# Patient Record
Sex: FEMALE | Race: BLACK OR AFRICAN AMERICAN | NOT HISPANIC OR LATINO | Employment: FULL TIME | URBAN - METROPOLITAN AREA
[De-identification: names, ages, dates, MRNs, and addresses within clinical notes are randomized per-mention and may not be internally consistent; named-entity substitution may affect disease eponyms.]

---

## 2017-08-04 ENCOUNTER — APPOINTMENT (OUTPATIENT)
Dept: LAB | Facility: HOSPITAL | Age: 52
End: 2017-08-04

## 2017-08-04 ENCOUNTER — APPOINTMENT (OUTPATIENT)
Dept: ONCOLOGY | Facility: CLINIC | Age: 52
End: 2017-08-04

## 2017-08-14 ENCOUNTER — LAB (OUTPATIENT)
Dept: OTHER | Facility: HOSPITAL | Age: 52
End: 2017-08-14

## 2017-08-14 ENCOUNTER — OFFICE VISIT (OUTPATIENT)
Dept: ONCOLOGY | Facility: CLINIC | Age: 52
End: 2017-08-14

## 2017-08-14 VITALS
HEART RATE: 86 BPM | BODY MASS INDEX: 36.37 KG/M2 | TEMPERATURE: 98.4 F | RESPIRATION RATE: 16 BRPM | SYSTOLIC BLOOD PRESSURE: 124 MMHG | HEIGHT: 67 IN | WEIGHT: 231.7 LBS | OXYGEN SATURATION: 99 % | DIASTOLIC BLOOD PRESSURE: 78 MMHG

## 2017-08-14 DIAGNOSIS — D50.0 IRON DEFICIENCY ANEMIA DUE TO CHRONIC BLOOD LOSS: ICD-10-CM

## 2017-08-14 DIAGNOSIS — D68.51 FACTOR V LEIDEN MUTATION (HCC): Primary | ICD-10-CM

## 2017-08-14 DIAGNOSIS — D68.51 FACTOR V LEIDEN MUTATION (HCC): ICD-10-CM

## 2017-08-14 DIAGNOSIS — Z86.718 HISTORY OF DVT OF LOWER EXTREMITY: ICD-10-CM

## 2017-08-14 DIAGNOSIS — D56.3 BETA THALASSEMIA TRAIT: Primary | ICD-10-CM

## 2017-08-14 DIAGNOSIS — D89.2 HYPERGAMMAGLOBULINEMIA: ICD-10-CM

## 2017-08-14 LAB
BASOPHILS # BLD AUTO: 0.05 10*3/MM3 (ref 0–0.2)
BASOPHILS NFR BLD AUTO: 0.8 % (ref 0–1.5)
DEPRECATED RDW RBC AUTO: 42.5 FL (ref 37–54)
EOSINOPHIL # BLD AUTO: 0.06 10*3/MM3 (ref 0–0.7)
EOSINOPHIL NFR BLD AUTO: 0.9 % (ref 0.3–6.2)
ERYTHROCYTE [DISTWIDTH] IN BLOOD BY AUTOMATED COUNT: 13.7 % (ref 11.7–13)
FERRITIN SERPL-MCNC: 38.7 NG/ML (ref 13–150)
HCT VFR BLD AUTO: 34 % (ref 35.6–45.5)
HGB BLD-MCNC: 11.5 G/DL (ref 11.9–15.5)
IMM GRANULOCYTES # BLD: 0.02 10*3/MM3 (ref 0–0.03)
IMM GRANULOCYTES NFR BLD: 0.3 % (ref 0–0.5)
IRON 24H UR-MRATE: 57 MCG/DL (ref 37–145)
IRON SATN MFR SERPL: 13 % (ref 20–50)
LYMPHOCYTES # BLD AUTO: 2.14 10*3/MM3 (ref 0.9–4.8)
LYMPHOCYTES NFR BLD AUTO: 32.3 % (ref 19.6–45.3)
MCH RBC QN AUTO: 28.5 PG (ref 26.9–32)
MCHC RBC AUTO-ENTMCNC: 33.8 G/DL (ref 32.4–36.3)
MCV RBC AUTO: 84.4 FL (ref 80.5–98.2)
MONOCYTES # BLD AUTO: 0.48 10*3/MM3 (ref 0.2–1.2)
MONOCYTES NFR BLD AUTO: 7.2 % (ref 5–12)
NEUTROPHILS # BLD AUTO: 3.88 10*3/MM3 (ref 1.9–8.1)
NEUTROPHILS NFR BLD AUTO: 58.5 % (ref 42.7–76)
NRBC BLD MANUAL-RTO: 0 /100 WBC (ref 0–0)
PLATELET # BLD AUTO: 241 10*3/MM3 (ref 140–500)
PMV BLD AUTO: 10.4 FL (ref 6–12)
RBC # BLD AUTO: 4.03 10*6/MM3 (ref 3.9–5.2)
TIBC SERPL-MCNC: 437 MCG/DL (ref 298–536)
TRANSFERRIN SERPL-MCNC: 293 MG/DL (ref 200–360)
WBC NRBC COR # BLD: 6.63 10*3/MM3 (ref 4.5–10.7)

## 2017-08-14 PROCEDURE — 85025 COMPLETE CBC W/AUTO DIFF WBC: CPT | Performed by: INTERNAL MEDICINE

## 2017-08-14 PROCEDURE — 83540 ASSAY OF IRON: CPT | Performed by: INTERNAL MEDICINE

## 2017-08-14 PROCEDURE — 82728 ASSAY OF FERRITIN: CPT | Performed by: INTERNAL MEDICINE

## 2017-08-14 PROCEDURE — 99214 OFFICE O/P EST MOD 30 MIN: CPT | Performed by: INTERNAL MEDICINE

## 2017-08-14 PROCEDURE — 80053 COMPREHEN METABOLIC PANEL: CPT | Performed by: INTERNAL MEDICINE

## 2017-08-14 PROCEDURE — 36415 COLL VENOUS BLD VENIPUNCTURE: CPT

## 2017-08-14 PROCEDURE — 84466 ASSAY OF TRANSFERRIN: CPT | Performed by: INTERNAL MEDICINE

## 2017-08-14 RX ORDER — ASPIRIN 81 MG/1
81 TABLET ORAL DAILY
Qty: 30 TABLET | Refills: 11 | Status: SHIPPED | OUTPATIENT
Start: 2017-08-14 | End: 2019-04-25 | Stop reason: SDUPTHER

## 2017-08-14 RX ORDER — MELOXICAM 15 MG/1
15 TABLET ORAL
COMMUNITY
End: 2020-09-22 | Stop reason: SDDI

## 2017-08-14 RX ORDER — FERROUS SULFATE 325(65) MG
325 TABLET ORAL
Qty: 30 TABLET | Refills: 11 | Status: SHIPPED | OUTPATIENT
Start: 2017-08-14 | End: 2019-04-25 | Stop reason: SDUPTHER

## 2017-08-14 NOTE — PROGRESS NOTES
Subjective     CHIEF COMPLAINT:    1. Factor V Leiden heterozygous mutation with history of lower extremity DVT following hysterectomy in .   2. Beta thalassemia trait.     HISTORY OF PRESENT ILLNESS:     Kylie Bowie is a 52 y.o.  patient with the above medical history. She returns today for follow-up and she reports continued problems with arthritis in both shoulders.  She is not having pain in the left knee but reports occasional pain in the left leg distally.  She is no longer taking the aspirin.  We discussed at the last visit taking oral iron once a day but she states that she did not  the medicine and did not take any iron.  She remembers that she had problem with diarrhea in the past when she took oral iron.      Past Medical History:   Diagnosis Date   • Arthritis    • Asthma    • Beta thalassemia trait    • Factor V Leiden mutation     HETEROZYGOUS   • History of DVT of lower extremity    • Hypertension    • Leiomyoma    • Thrombophlebitis     Septic       Past Surgical History:   Procedure Laterality Date   •  SECTION  2003   • DIAGNOSTIC LAPAROSCOPY EXPLORATORY LAPAROTOMY  2000   • FOOT SURGERY     • HYSTERECTOMY     • HYSTEROTOMY  1999   • MYOMECTOMY         Cancer-related family history includes Cancer in her cousin, cousin, maternal aunt, and maternal grandmother.    SCHEDULED MEDS:       Current Outpatient Prescriptions:   •  albuterol (PROVENTIL HFA;VENTOLIN HFA) 108 (90 BASE) MCG/ACT inhaler, Inhale 2 puffs every 4 (four) hours as needed for wheezing., Disp: , Rfl:   •  amLODIPine (NORVASC) 10 MG tablet, Take 10 mg by mouth daily., Disp: , Rfl:   •  amLODIPine (NORVASC) 5 MG tablet, Take 5 mg by mouth daily., Disp: , Rfl:   •  aspirin 81 MG EC tablet, Take 81 mg by mouth daily., Disp: , Rfl:   •  atorvastatin (LIPITOR) 10 MG tablet, Take 10 mg by mouth Daily., Disp: , Rfl:   •  cetirizine (ZyrTEC) 10 MG tablet, Take 10 mg by mouth daily., Disp: , Rfl:  "  •  cyclobenzaprine (FLEXERIL) 10 MG tablet, Take 10 mg by mouth 3 (Three) Times a Day As Needed for muscle spasms., Disp: , Rfl:   •  Diclofenac Sodium 1.5 % solution, Place  on the skin., Disp: , Rfl:   •  ferrous sulfate 325 (65 FE) MG tablet, Take 1 tablet by mouth daily with breakfast., Disp: , Rfl:   •  fluticasone (FLONASE) 50 MCG/ACT nasal spray, 2 sprays into each nostril daily. Administer 2 sprays in each nostril for each dose., Disp: , Rfl:   •  lisinopril (PRINIVIL,ZESTRIL) 20 MG tablet, Take 20 mg by mouth daily., Disp: , Rfl:   •  meloxicam (MOBIC) 15 MG tablet, Take 15 mg by mouth., Disp: , Rfl:   •  montelukast (SINGULAIR) 10 MG tablet, Take 10 mg by mouth every night., Disp: , Rfl:   •  naproxen (NAPROSYN) 500 MG tablet, , Disp: , Rfl:   •  traMADol (ULTRAM) 50 MG tablet, , Disp: , Rfl:     REVIEW OF SYSTEMS:  GENERAL:  No fever or chills. No weight change.    SKIN:  No rashes or lesions.  HEME/LYMPH: Anemia.   RESPIRATORY:  No cough, shortness of breath, hemoptysis or wheezing.  CVS:  No chest pain.  GI:  No blood in the stool.  She will have evaluation by GI next month  MUSCULOSKELETAL:  See HPI.      Objective   VITAL SIGNS:     Vitals:    08/14/17 1619   BP: 124/78   Pulse: 86   Resp: 16   Temp: 98.4 °F (36.9 °C)   TempSrc: Oral   SpO2: 99%   Weight: 231 lb 11.2 oz (105 kg)   Height: 66.93\" (170 cm)  Comment: new pt       Wt Readings from Last 3 Encounters:   08/14/17 231 lb 11.2 oz (105 kg)   06/21/16 230 lb (104 kg)       PHYSICAL EXAMINATION:  GENERAL:  The patient appears in good general condition, not in acute distress.  SKIN:  No skin rashes or lesions. No ecchymosis or petechiae.  EXTREMITIES:  No cyanosis or edema. No calf tenderness. Tender in the left Leg above the ankle without redness or warmth.   NEUROLOGICAL:  No focal neurological deficits.    RESULT REVIEW:       Results from last 7 days  Lab Units 08/14/17  1612   WBC 10*3/mm3 6.63   NEUTROS ABS 10*3/mm3 3.88   HEMOGLOBIN g/dL " 11.5*   HEMATOCRIT % 34.0*   PLATELETS 10*3/mm3 241       Results from last 7 days  Lab Units 08/14/17  1612   FERRITIN ng/mL 38.70   IRON mcg/dL 57   TIBC mcg/dL 437       Assessment/Plan    1.  Factor V Leiden heterozygous mutation with history of left lower extremity DVT that developed after a myomectomy in 1999. She was placed on Xarelto 10 mg a day postoperatively after she had a foot surgery.  She was placed in the past on Lovenox prophylaxis during pregnancy.      She does not have symptoms to suggest deep vein thrombosis at present.  Her left leg pain is arthritic.     Patient has hypercholesterolemia and hypertension and has family history of coronary disease and CABG at the age of 62.  I asked her to take enteric coated ASA 81 mg a day.      2.  Anemia secondary to iron deficiency in a patient with Beta thalassemia trait.  She did not take the oral iron and that we discussed last year.  I will send the prescription to her pharmacy with oral iron to take once daily.    3.  Elevated total globulin.  This measured 4.3 g in May 2017 on labs done at her PCPs office.  I will repeat a CMP today.  If it remains elevated, we will need to obtain protein electrophoresis and immunofixation.    We will repeat a CBC with iron studies in 6 months.  We'll see in follow-up in one year with CBC and iron studies.        Merly Giang MD  08/14/17

## 2017-08-15 LAB
ALBUMIN SERPL-MCNC: 3.9 G/DL (ref 3.5–5.2)
ALBUMIN/GLOB SERPL: 1.1 G/DL
ALP SERPL-CCNC: 65 U/L (ref 39–117)
ALT SERPL W P-5'-P-CCNC: 13 U/L (ref 1–33)
ANION GAP SERPL CALCULATED.3IONS-SCNC: 12.6 MMOL/L
AST SERPL-CCNC: 21 U/L (ref 1–32)
BILIRUB SERPL-MCNC: 0.3 MG/DL (ref 0.1–1.2)
BUN BLD-MCNC: 18 MG/DL (ref 6–20)
BUN/CREAT SERPL: 17.1 (ref 7–25)
CALCIUM SPEC-SCNC: 8.6 MG/DL (ref 8.6–10.5)
CHLORIDE SERPL-SCNC: 102 MMOL/L (ref 98–107)
CO2 SERPL-SCNC: 25.4 MMOL/L (ref 22–29)
CREAT BLD-MCNC: 1.05 MG/DL (ref 0.57–1)
GFR SERPL CREATININE-BSD FRML MDRD: 67 ML/MIN/1.73
GLOBULIN UR ELPH-MCNC: 3.6 GM/DL
GLUCOSE BLD-MCNC: 93 MG/DL (ref 65–99)
POTASSIUM BLD-SCNC: 4.4 MMOL/L (ref 3.5–5.2)
PROT SERPL-MCNC: 7.5 G/DL (ref 6–8.5)
SODIUM BLD-SCNC: 140 MMOL/L (ref 136–145)

## 2017-09-26 ENCOUNTER — OFFICE VISIT (OUTPATIENT)
Dept: GASTROENTEROLOGY | Facility: CLINIC | Age: 52
End: 2017-09-26

## 2017-09-26 VITALS
SYSTOLIC BLOOD PRESSURE: 134 MMHG | BODY MASS INDEX: 35.12 KG/M2 | WEIGHT: 223.8 LBS | TEMPERATURE: 99 F | DIASTOLIC BLOOD PRESSURE: 84 MMHG | HEIGHT: 67 IN

## 2017-09-26 DIAGNOSIS — R19.7 DIARRHEA, UNSPECIFIED TYPE: Primary | ICD-10-CM

## 2017-09-26 DIAGNOSIS — R13.10 DYSPHAGIA, UNSPECIFIED TYPE: ICD-10-CM

## 2017-09-26 DIAGNOSIS — R12 HEARTBURN: ICD-10-CM

## 2017-09-26 PROCEDURE — 99203 OFFICE O/P NEW LOW 30 MIN: CPT | Performed by: INTERNAL MEDICINE

## 2017-09-26 NOTE — PROGRESS NOTES
Chief Complaint   Patient presents with   • Diarrhea     off & on   • Heartburn     Kylie Bowie is a 52 y.o. female who presents with Heartburn and intermittent difficulty swallowing solids.  She does note that occasionally she feels like the food won't go down.  She has trouble if she eats late at night.  She sometimes will wake up and the moment night and throughout.  This is worse with spicy or fatty foods.  She does not take medication for this regularly.  She's tried to modify her diet.  She is a long history of loose stools.  She has postprandial urgency.  She has 2-3 loose bowel movements daily.  She's not been any blood in her stool.  She's had no prior endoscopic evaluation.  She's no family history of colon rectal cancer or polyps.  HPI  Past Medical History:   Diagnosis Date   • Arthritis    • Asthma    • Beta thalassemia trait    • Factor V Leiden mutation     HETEROZYGOUS   • History of DVT of lower extremity    • Hypertension    • Leiomyoma    • Thrombophlebitis     Septic     Past Surgical History:   Procedure Laterality Date   •  SECTION  2003   • DIAGNOSTIC LAPAROSCOPY EXPLORATORY LAPAROTOMY  2000   • FOOT SURGERY     • HYSTERECTOMY     • HYSTEROTOMY  1999   • MYOMECTOMY         Current Outpatient Prescriptions:   •  albuterol (PROVENTIL HFA;VENTOLIN HFA) 108 (90 BASE) MCG/ACT inhaler, Inhale 2 puffs every 4 (four) hours as needed for wheezing., Disp: , Rfl:   •  amLODIPine (NORVASC) 10 MG tablet, Take 10 mg by mouth daily., Disp: , Rfl:   •  aspirin 81 MG EC tablet, Take 1 tablet by mouth Daily., Disp: 30 tablet, Rfl: 11  •  atorvastatin (LIPITOR) 10 MG tablet, Take 10 mg by mouth Daily., Disp: , Rfl:   •  lisinopril (PRINIVIL,ZESTRIL) 20 MG tablet, Take 20 mg by mouth daily., Disp: , Rfl:   •  amLODIPine (NORVASC) 5 MG tablet, Take 5 mg by mouth daily., Disp: , Rfl:   •  Diclofenac Sodium 1.5 % solution, Place  on the skin., Disp: , Rfl:   •  ferrous sulfate  325 (65 FE) MG tablet, Take 1 tablet by mouth Daily With Breakfast., Disp: 30 tablet, Rfl: 11  •  fluticasone (FLONASE) 50 MCG/ACT nasal spray, 2 sprays into each nostril daily. Administer 2 sprays in each nostril for each dose., Disp: , Rfl:   •  meloxicam (MOBIC) 15 MG tablet, Take 15 mg by mouth., Disp: , Rfl:   •  montelukast (SINGULAIR) 10 MG tablet, Take 10 mg by mouth every night., Disp: , Rfl:   •  naproxen (NAPROSYN) 500 MG tablet, , Disp: , Rfl:   •  traMADol (ULTRAM) 50 MG tablet, , Disp: , Rfl:   Allergies   Allergen Reactions   • Keflex [Cephalexin]    • Latex    • Amoxicillin-Pot Clavulanate Diarrhea     Social History     Social History   • Marital status: Single     Spouse name: N/A   • Number of children: N/A   • Years of education: N/A     Occupational History   • Not on file.     Social History Main Topics   • Smoking status: Never Smoker   • Smokeless tobacco: Never Used   • Alcohol use Yes      Comment: social   • Drug use: No   • Sexual activity: Not on file     Other Topics Concern   • Not on file     Social History Narrative     Family History   Problem Relation Age of Onset   • Heart disease Other    • Hypertension Mother    • Heart disease Father    • Diabetes Father    • Cancer Maternal Aunt      Breast   • Diabetes Maternal Aunt    • Lupus Paternal Aunt    • Cancer Maternal Grandmother    • Anemia Cousin    • Cancer Cousin    • Cancer Cousin      leukemia     Review of Systems   Constitutional: Negative for appetite change and unexpected weight change.   HENT: Positive for sinus pressure.    Respiratory: Positive for chest tightness.    Gastrointestinal: Positive for abdominal pain and diarrhea. Negative for blood in stool, nausea and vomiting.   All other systems reviewed and are negative.    Vitals:    09/26/17 1513   BP: 134/84   Temp: 99 °F (37.2 °C)     Last Weight    09/26/17  1513   Weight: 223 lb 12.8 oz (102 kg)     Physical Exam   Constitutional: She is oriented to person,  place, and time. She appears well-developed and well-nourished.   HENT:   Head: Normocephalic and atraumatic.   Eyes: Conjunctivae are normal. No scleral icterus.   Neck: Normal range of motion. Neck supple.   Cardiovascular: Normal rate and regular rhythm.    Pulmonary/Chest: Effort normal and breath sounds normal.   Abdominal: Soft. Bowel sounds are normal. She exhibits no distension. There is tenderness.   rlq pain   Musculoskeletal: She exhibits no edema.   Neurological: She is alert and oriented to person, place, and time.   Skin: Skin is warm and dry.   Psychiatric: She has a normal mood and affect.   Nursing note and vitals reviewed.    No images are attached to the encounter.  No notes on file  Kylie was seen today for diarrhea and heartburn.    Diagnoses and all orders for this visit:    Diarrhea, unspecified type  -     Case Request; Standing  -     Case Request    Dysphagia, unspecified type  -     Case Request; Standing  -     Case Request    Heartburn    Other orders  -     Implement Anesthesia orders day of procedure.; Standing  -     Obtain informed consent; Standing  -     Verify bowel prep was successful; Standing  -     Give tap water enema if bowel prep was insufficient; Standing  -     Give Fleet's enema for intolerance of bowel prep; Standing     plan-  Given her symptoms will plan for an EGD and colonoscopy for further evaluation.  She is not interested in adding additional medications at this time.  We'll hold off on acid suppression until we perform an EGD.  Further recommendations based upon the results of her scopes.  Consider trial of an antispasmodic for when necessary use if no inflammation is found.

## 2017-11-17 ENCOUNTER — ANESTHESIA (OUTPATIENT)
Dept: GASTROENTEROLOGY | Facility: HOSPITAL | Age: 52
End: 2017-11-17

## 2017-11-17 ENCOUNTER — HOSPITAL ENCOUNTER (OUTPATIENT)
Facility: HOSPITAL | Age: 52
Setting detail: HOSPITAL OUTPATIENT SURGERY
Discharge: HOME | End: 2017-11-17
Attending: INTERNAL MEDICINE | Admitting: INTERNAL MEDICINE

## 2017-11-17 ENCOUNTER — ANESTHESIA EVENT (OUTPATIENT)
Dept: GASTROENTEROLOGY | Facility: HOSPITAL | Age: 52
End: 2017-11-17

## 2017-11-17 VITALS
SYSTOLIC BLOOD PRESSURE: 126 MMHG | BODY MASS INDEX: 35.39 KG/M2 | HEART RATE: 82 BPM | TEMPERATURE: 97.7 F | DIASTOLIC BLOOD PRESSURE: 93 MMHG | WEIGHT: 225.5 LBS | RESPIRATION RATE: 16 BRPM | HEIGHT: 67 IN | OXYGEN SATURATION: 100 %

## 2017-11-17 DIAGNOSIS — R19.7 DIARRHEA, UNSPECIFIED TYPE: ICD-10-CM

## 2017-11-17 DIAGNOSIS — R13.10 DYSPHAGIA, UNSPECIFIED TYPE: ICD-10-CM

## 2017-11-17 LAB
B-HCG UR QL: NEGATIVE
INTERNAL NEGATIVE CONTROL: NEGATIVE
INTERNAL POSITIVE CONTROL: POSITIVE
Lab: NORMAL

## 2017-11-17 PROCEDURE — 88312 SPECIAL STAINS GROUP 1: CPT | Performed by: INTERNAL MEDICINE

## 2017-11-17 PROCEDURE — 25010000002 PROPOFOL 10 MG/ML EMULSION: Performed by: ANESTHESIOLOGY

## 2017-11-17 PROCEDURE — 43239 EGD BIOPSY SINGLE/MULTIPLE: CPT | Performed by: INTERNAL MEDICINE

## 2017-11-17 PROCEDURE — 88305 TISSUE EXAM BY PATHOLOGIST: CPT | Performed by: INTERNAL MEDICINE

## 2017-11-17 PROCEDURE — 45380 COLONOSCOPY AND BIOPSY: CPT | Performed by: INTERNAL MEDICINE

## 2017-11-17 PROCEDURE — S0260 H&P FOR SURGERY: HCPCS | Performed by: INTERNAL MEDICINE

## 2017-11-17 RX ORDER — PROPOFOL 10 MG/ML
VIAL (ML) INTRAVENOUS AS NEEDED
Status: DISCONTINUED | OUTPATIENT
Start: 2017-11-17 | End: 2017-11-17 | Stop reason: SURG

## 2017-11-17 RX ORDER — SODIUM CHLORIDE, SODIUM LACTATE, POTASSIUM CHLORIDE, CALCIUM CHLORIDE 600; 310; 30; 20 MG/100ML; MG/100ML; MG/100ML; MG/100ML
1000 INJECTION, SOLUTION INTRAVENOUS CONTINUOUS PRN
Status: DISCONTINUED | OUTPATIENT
Start: 2017-11-17 | End: 2017-11-17 | Stop reason: HOSPADM

## 2017-11-17 RX ORDER — SODIUM CHLORIDE 0.9 % (FLUSH) 0.9 %
3 SYRINGE (ML) INJECTION AS NEEDED
Status: DISCONTINUED | OUTPATIENT
Start: 2017-11-17 | End: 2017-11-17 | Stop reason: HOSPADM

## 2017-11-17 RX ORDER — LIDOCAINE HYDROCHLORIDE 20 MG/ML
INJECTION, SOLUTION INFILTRATION; PERINEURAL AS NEEDED
Status: DISCONTINUED | OUTPATIENT
Start: 2017-11-17 | End: 2017-11-17 | Stop reason: SURG

## 2017-11-17 RX ORDER — PROPOFOL 10 MG/ML
VIAL (ML) INTRAVENOUS CONTINUOUS PRN
Status: DISCONTINUED | OUTPATIENT
Start: 2017-11-17 | End: 2017-11-17 | Stop reason: SURG

## 2017-11-17 RX ORDER — OMEPRAZOLE 20 MG/1
20 CAPSULE, DELAYED RELEASE ORAL 2 TIMES DAILY
Qty: 180 CAPSULE | Refills: 0 | Status: SHIPPED | OUTPATIENT
Start: 2017-11-17 | End: 2018-02-06 | Stop reason: DRUGHIGH

## 2017-11-17 RX ADMIN — PROPOFOL 150 MG: 10 INJECTION, EMULSION INTRAVENOUS at 12:35

## 2017-11-17 RX ADMIN — PROPOFOL 100 MCG/KG/MIN: 10 INJECTION, EMULSION INTRAVENOUS at 12:35

## 2017-11-17 RX ADMIN — LIDOCAINE HYDROCHLORIDE 60 MG: 20 INJECTION, SOLUTION INFILTRATION; PERINEURAL at 12:35

## 2017-11-17 RX ADMIN — SODIUM CHLORIDE, POTASSIUM CHLORIDE, SODIUM LACTATE AND CALCIUM CHLORIDE 1000 ML: 600; 310; 30; 20 INJECTION, SOLUTION INTRAVENOUS at 11:50

## 2017-11-17 RX ADMIN — SODIUM CHLORIDE, POTASSIUM CHLORIDE, SODIUM LACTATE AND CALCIUM CHLORIDE: 600; 310; 30; 20 INJECTION, SOLUTION INTRAVENOUS at 12:30

## 2017-11-17 NOTE — ANESTHESIA POSTPROCEDURE EVALUATION
"Patient: Kylie Bowie    Procedure Summary     Date Anesthesia Start Anesthesia Stop Room / Location    11/17/17 1233 1308  [facility] /  [facility]       Procedure Diagnosis Surgeon Provider    ESOPHAGOGASTRODUODENOSCOPY with biopsies (N/A Esophagus); COLONOSCOPY to cecum with biopsy and cold polypectomy (N/A ) Dysphagia, unspecified type; Diarrhea, unspecified type  (Dysphagia, unspecified type [R13.10]; Diarrhea, unspecified type [R19.7]) MD Gaetano Tillman MD          Anesthesia Type: MAC  Last vitals  BP   120/83 (11/17/17 1308)   Temp   36.5 °C (97.7 °F) (11/17/17 1308)   Pulse   78 (11/17/17 1308)   Resp   16 (11/17/17 1308)     SpO2   100 % (11/17/17 1308)     Post Anesthesia Care and Evaluation    Patient location during evaluation: PACU  Patient participation: complete - patient participated  Level of consciousness: awake  Pain score: 0  Pain management: adequate  Airway patency: patent  Anesthetic complications: No anesthetic complications  PONV Status: none  Cardiovascular status: acceptable  Respiratory status: acceptable  Hydration status: acceptable    Comments: /83 (BP Location: Left arm, Patient Position: Lying)  Pulse 78  Temp 36.5 °C (97.7 °F) (Oral)   Resp 16  Ht 67\" (170.2 cm)  Wt 225 lb 8 oz (102 kg)  [facility] 11/14/2017 (Exact Date)  SpO2 100%  BMI 35.32 kg/m2      "

## 2017-11-17 NOTE — DISCHARGE INSTRUCTIONS
Any questions or concerns, call Dr. Flood @ [phone]    Make appointment to see Dr. Flood after the 1st of the year

## 2017-11-17 NOTE — H&P
[facility]  Pre Procedure History & Physical    Chief Complaint:   Dysphagia, heartburn, screening, incidental diarrhea    Subjective     HPI:   Kylie Bowie is a 52 y.o. female who presents with Heartburn and intermittent difficulty swallowing solids.  She does note that occasionally she feels like the food won't go down.  She has trouble if she eats late at night.  She sometimes will wake up and the moment night and throughout.  This is worse with spicy or fatty foods.  She does not take medication for this regularly.  She's tried to modify her diet.  She is a long history of loose stools.  She has postprandial urgency.  She has 2-3 loose bowel movements daily.  She's not been any blood in her stool.  She's had no prior endoscopic evaluation.  She's no family history of colon rectal cancer or polyps.    Past Medical History:   Past Medical History:   Diagnosis Date   • Arthritis    • Asthma    • Beta thalassemia trait    • Factor V Leiden mutation     HETEROZYGOUS   • History of DVT of lower extremity    • History of transfusion    • Hypertension    • Leiomyoma    • PONV (postoperative nausea and vomiting)    • Thrombophlebitis     Septic       Past Surgical History:  Past Surgical History:   Procedure Laterality Date   • ANKLE SURGERY Left    •  SECTION  2003   • DIAGNOSTIC LAPAROSCOPY EXPLORATORY LAPAROTOMY  2000   • FOOT SURGERY     • HYSTEROTOMY  1999   • MYOMECTOMY         Family History:  Family History   Problem Relation Age of Onset   • Heart disease Other    • Hypertension Mother    • Heart disease Father    • Diabetes Father    • Cancer Maternal Aunt      Breast   • Diabetes Maternal Aunt    • Lupus Paternal Aunt    • Cancer Maternal Grandmother    • Anemia Cousin    • Cancer Cousin    • Cancer Cousin      leukemia       Social History:   reports that she has never smoked. She has never used smokeless tobacco. She reports that she drinks alcohol.  "She reports that she does not use illicit drugs.    Medications:   Prescriptions Prior to Admission   Medication Sig Dispense Refill Last Dose   • amLODIPine (NORVASC) 10 MG tablet Take 10 mg by mouth daily.   11/17/2017 at Unknown time   • atorvastatin (LIPITOR) 10 MG tablet Take 10 mg by mouth Daily.   11/16/2017 at Unknown time   • Diclofenac Sodium 1.5 % solution Place  on the skin.   Past Month at Unknown time   • lisinopril (PRINIVIL,ZESTRIL) 20 MG tablet Take 20 mg by mouth daily.   11/17/2017 at Unknown time   • albuterol (PROVENTIL HFA;VENTOLIN HFA) 108 (90 BASE) MCG/ACT inhaler Inhale 2 puffs every 4 (four) hours as needed for wheezing.   More than a month at Unknown time   • aspirin 81 MG EC tablet Take 1 tablet by mouth Daily. 30 tablet 11 More than a month at Unknown time   • ferrous sulfate 325 (65 FE) MG tablet Take 1 tablet by mouth Daily With Breakfast. 30 tablet 11 More than a month at Unknown time   • fluticasone (FLONASE) 50 MCG/ACT nasal spray 2 sprays into each nostril daily. Administer 2 sprays in each nostril for each dose.   Unknown at Unknown time   • meloxicam (MOBIC) 15 MG tablet Take 15 mg by mouth.   11/13/2017   • montelukast (SINGULAIR) 10 MG tablet Take 10 mg by mouth every night.   Unknown at Unknown time   • naproxen (NAPROSYN) 500 MG tablet    Unknown at Unknown time   • traMADol (ULTRAM) 50 MG tablet    More than a month at Unknown time       Allergies:  Keflex [cephalexin]; Latex; and Amoxicillin-pot clavulanate    ROS:    Pertinent items are noted in HPI, all other systems reviewed and negative     Objective     Blood pressure 130/100, pulse 71, temperature 98.6 °F (37 °C), temperature source Oral, resp. rate 16, height 67\" (170.2 cm), weight 225 lb 8 oz (102 kg), last menstrual period 11/14/2017, SpO2 97 %.    Physical Exam   Constitutional: Pt is oriented to person, place, and time and well-developed, well-nourished, and in no distress.   Mouth/Throat: Oropharynx is clear and " moist.   Neck: Normal range of motion.   Cardiovascular: Normal rate, regular rhythm   Pulmonary/Chest: Effort normal  Abdominal: Soft. Nontender  Skin: Skin is warm and dry.   Psychiatric: Mood, memory, affect and judgment normal.     Assessment/Plan     Diagnosis:  Dysphagia, heartburn, screening, incidental diarrhea    Anticipated Surgical Procedure:  egd/colonoscopy    The risks, benefits, and alternatives of this procedure have been discussed with the patient or the responsible party- the patient understands and agrees to proceed.

## 2017-11-17 NOTE — PLAN OF CARE
Problem: Patient Care Overview (Adult)  Goal: Plan of Care Review  Outcome: Ongoing (interventions implemented as appropriate)    11/17/17 1125   Coping/Psychosocial Response Interventions   Plan Of Care Reviewed With patient   Patient Care Overview   Progress progress toward functional goals as expected       Goal: Adult Individualization and Mutuality  Outcome: Ongoing (interventions implemented as appropriate)  Goal: Discharge Needs Assessment  Outcome: Ongoing (interventions implemented as appropriate)    11/17/17 1125   Discharge Needs Assessment   Concerns To Be Addressed no discharge needs identified   Discharge Disposition home or self-care   Living Environment   Transportation Available car;family or friend will provide         Problem: GI Endoscopy (Adult)  Intervention: Monitor/Manage Procedure Recovery    11/17/17 1125   Respiratory Interventions   Airway/Ventilation Management airway patency maintained   Coping/Psychosocial Interventions   Environmental Support calm environment promoted   Activity   Activity Type activity adjusted per tolerance   Cardiac Interventions   Warming Thermoregulation Maintenance warm blankets applied       Intervention: Prevent Jenny-procedural Injury    11/17/17 1125   Positioning   Positioning side lying, left         Goal: Signs and Symptoms of Listed Potential Problems Will be Absent or Manageable (GI Endoscopy)  Outcome: Ongoing (interventions implemented as appropriate)    11/17/17 1125   GI Endoscopy   Problems Assessed (GI Endoscopy) all   Problems Present (GI Endoscopy) none

## 2017-11-17 NOTE — ANESTHESIA PREPROCEDURE EVALUATION
Anesthesia Evaluation     Patient summary reviewed and Nursing notes reviewed          Airway   Mallampati: I  TM distance: <3 FB  Neck ROM: full  no difficulty expected  Dental - normal exam     Pulmonary - normal exam   (+) asthma,   Cardiovascular - normal exam    (+) hypertension,       Neuro/Psych- negative ROS  GI/Hepatic/Renal/Endo - negative ROS     Musculoskeletal     Abdominal  - normal exam    Bowel sounds: normal.   Substance History - negative use     OB/GYN negative ob/gyn ROS         Other   (+) arthritis                                     Anesthesia Plan    ASA 3     MAC     Anesthetic plan and risks discussed with patient.

## 2017-11-20 LAB
CYTO UR: NORMAL
LAB AP CASE REPORT: NORMAL
Lab: NORMAL
PATH REPORT.FINAL DX SPEC: NORMAL
PATH REPORT.GROSS SPEC: NORMAL

## 2017-11-29 ENCOUNTER — TELEPHONE (OUTPATIENT)
Dept: GASTROENTEROLOGY | Facility: CLINIC | Age: 52
End: 2017-11-29

## 2017-11-29 NOTE — TELEPHONE ENCOUNTER
Gastric bx with mild inflammation.  Small bowel bx normal as were the colon bx.        The colon polyp(s) showed hyperplastic change, which is non-cancerous and not pre-cancerous. Follow-up colonoscopy in 5 years is advised.     8 wk o/v - pls schedule

## 2018-02-02 ENCOUNTER — LAB (OUTPATIENT)
Dept: LAB | Facility: HOSPITAL | Age: 53
End: 2018-02-02

## 2018-02-02 ENCOUNTER — APPOINTMENT (OUTPATIENT)
Dept: OTHER | Facility: HOSPITAL | Age: 53
End: 2018-02-02

## 2018-02-02 DIAGNOSIS — D50.0 IRON DEFICIENCY ANEMIA DUE TO CHRONIC BLOOD LOSS: ICD-10-CM

## 2018-02-02 DIAGNOSIS — R13.19 ESOPHAGEAL DYSPHAGIA: Primary | ICD-10-CM

## 2018-02-02 DIAGNOSIS — Z86.718 HISTORY OF DVT OF LOWER EXTREMITY: ICD-10-CM

## 2018-02-02 LAB
BASOPHILS # BLD AUTO: 0.03 10*3/MM3 (ref 0–0.1)
BASOPHILS NFR BLD AUTO: 0.5 % (ref 0–1.1)
DEPRECATED RDW RBC AUTO: 43.5 FL (ref 37–49)
EOSINOPHIL # BLD AUTO: 0.05 10*3/MM3 (ref 0–0.36)
EOSINOPHIL NFR BLD AUTO: 0.8 % (ref 1–5)
ERYTHROCYTE [DISTWIDTH] IN BLOOD BY AUTOMATED COUNT: 14.2 % (ref 11.7–14.5)
FERRITIN SERPL-MCNC: 35.4 NG/ML (ref 11–207)
HCT VFR BLD AUTO: 39.6 % (ref 34–45)
HGB BLD-MCNC: 13.1 G/DL (ref 11.5–14.9)
IMM GRANULOCYTES # BLD: 0.02 10*3/MM3 (ref 0–0.03)
IMM GRANULOCYTES NFR BLD: 0.3 % (ref 0–0.5)
IRON 24H UR-MRATE: 78 MCG/DL (ref 37–145)
IRON SATN MFR SERPL: 19 % (ref 14–48)
LYMPHOCYTES # BLD AUTO: 2.29 10*3/MM3 (ref 1–3.5)
LYMPHOCYTES NFR BLD AUTO: 36.6 % (ref 20–49)
MCH RBC QN AUTO: 27.6 PG (ref 27–33)
MCHC RBC AUTO-ENTMCNC: 33.1 G/DL (ref 32–35)
MCV RBC AUTO: 83.5 FL (ref 83–97)
MONOCYTES # BLD AUTO: 0.64 10*3/MM3 (ref 0.25–0.8)
MONOCYTES NFR BLD AUTO: 10.2 % (ref 4–12)
NEUTROPHILS # BLD AUTO: 3.22 10*3/MM3 (ref 1.5–7)
NEUTROPHILS NFR BLD AUTO: 51.6 % (ref 39–75)
NRBC BLD MANUAL-RTO: 0 /100 WBC (ref 0–0)
PLATELET # BLD AUTO: 211 10*3/MM3 (ref 150–375)
PMV BLD AUTO: 11 FL (ref 8.9–12.1)
RBC # BLD AUTO: 4.74 10*6/MM3 (ref 3.9–5)
TIBC SERPL-MCNC: 420 MCG/DL (ref 249–505)
TRANSFERRIN SERPL-MCNC: 300 MG/DL (ref 200–360)
WBC NRBC COR # BLD: 6.25 10*3/MM3 (ref 4–10)

## 2018-02-02 PROCEDURE — 82728 ASSAY OF FERRITIN: CPT | Performed by: INTERNAL MEDICINE

## 2018-02-02 PROCEDURE — 36415 COLL VENOUS BLD VENIPUNCTURE: CPT | Performed by: INTERNAL MEDICINE

## 2018-02-02 PROCEDURE — 84466 ASSAY OF TRANSFERRIN: CPT | Performed by: INTERNAL MEDICINE

## 2018-02-02 PROCEDURE — 83540 ASSAY OF IRON: CPT | Performed by: INTERNAL MEDICINE

## 2018-02-02 PROCEDURE — 85025 COMPLETE CBC W/AUTO DIFF WBC: CPT | Performed by: INTERNAL MEDICINE

## 2018-02-06 ENCOUNTER — OFFICE VISIT (OUTPATIENT)
Dept: GASTROENTEROLOGY | Facility: CLINIC | Age: 53
End: 2018-02-06

## 2018-02-06 VITALS
DIASTOLIC BLOOD PRESSURE: 82 MMHG | TEMPERATURE: 97.8 F | BODY MASS INDEX: 37.89 KG/M2 | SYSTOLIC BLOOD PRESSURE: 124 MMHG | WEIGHT: 241.4 LBS | HEIGHT: 67 IN

## 2018-02-06 DIAGNOSIS — K21.00 GASTROESOPHAGEAL REFLUX DISEASE WITH ESOPHAGITIS: ICD-10-CM

## 2018-02-06 DIAGNOSIS — K63.5 HYPERPLASTIC POLYP OF TRANSVERSE COLON: ICD-10-CM

## 2018-02-06 DIAGNOSIS — R13.19 ESOPHAGEAL DYSPHAGIA: Primary | ICD-10-CM

## 2018-02-06 PROCEDURE — 99213 OFFICE O/P EST LOW 20 MIN: CPT | Performed by: INTERNAL MEDICINE

## 2018-02-06 RX ORDER — OMEPRAZOLE 40 MG/1
40 CAPSULE, DELAYED RELEASE ORAL DAILY
Qty: 30 CAPSULE | Refills: 3 | Status: SHIPPED | OUTPATIENT
Start: 2018-02-06 | End: 2018-08-15 | Stop reason: SDUPTHER

## 2018-02-06 NOTE — PROGRESS NOTES
Chief Complaint   Patient presents with   • Follow-up   • Heartburn   • Diarrhea       Kylie Bowie is a  53 y.o. female here for a follow up visit for GERD.  She continues to have heartburn.  She has not been taking the PPI - she never had it filled.  Bowels are moving fine. She had a recent c/s with removal of HP polyps - repeat in .  She has trouble in both day and night.  She has cut out fast food and greasy food.       HPI  Past Medical History:   Diagnosis Date   • Arthritis    • Asthma    • Beta thalassemia trait    • Factor V Leiden mutation     HETEROZYGOUS   • History of DVT of lower extremity    • History of transfusion    • Hypertension    • Leiomyoma    • PONV (postoperative nausea and vomiting)    • Thrombophlebitis     Septic     Past Surgical History:   Procedure Laterality Date   • ANKLE SURGERY Left    •  SECTION  2003   • COLONOSCOPY N/A 2017    Two 2-3mm polypsin the sigmoid and in the transverse colon, NBIH.  PATH: Hyperplastic polyps   • DIAGNOSTIC LAPAROSCOPY EXPLORATORY LAPAROTOMY  2000   • ENDOSCOPY N/A 2017    LA Grade B reflux esophagitis, small HH, erythematous mucosa in the antrum.  PATH: Focal minimal chronic inflammation   • FOOT SURGERY     • HYSTEROTOMY  1999   • MYOMECTOMY         Current Outpatient Prescriptions:   •  albuterol (PROVENTIL HFA;VENTOLIN HFA) 108 (90 BASE) MCG/ACT inhaler, Inhale 2 puffs every 4 (four) hours as needed for wheezing., Disp: , Rfl:   •  amLODIPine (NORVASC) 10 MG tablet, Take 10 mg by mouth daily., Disp: , Rfl:   •  aspirin 81 MG EC tablet, Take 1 tablet by mouth Daily., Disp: 30 tablet, Rfl: 11  •  atorvastatin (LIPITOR) 10 MG tablet, Take 10 mg by mouth Daily., Disp: , Rfl:   •  Diclofenac Sodium 1.5 % solution, Place  on the skin., Disp: , Rfl:   •  lisinopril (PRINIVIL,ZESTRIL) 20 MG tablet, Take 20 mg by mouth daily., Disp: , Rfl:   •  naproxen (NAPROSYN) 500 MG tablet, , Disp: , Rfl:   •  ferrous  sulfate 325 (65 FE) MG tablet, Take 1 tablet by mouth Daily With Breakfast., Disp: 30 tablet, Rfl: 11  •  fluticasone (FLONASE) 50 MCG/ACT nasal spray, 2 sprays into each nostril daily. Administer 2 sprays in each nostril for each dose., Disp: , Rfl:   •  meloxicam (MOBIC) 15 MG tablet, Take 15 mg by mouth., Disp: , Rfl:   •  montelukast (SINGULAIR) 10 MG tablet, Take 10 mg by mouth every night., Disp: , Rfl:   •  omeprazole (PRILOSEC) 40 MG capsule, Take 1 capsule by mouth Daily., Disp: 30 capsule, Rfl: 3  •  traMADol (ULTRAM) 50 MG tablet, , Disp: , Rfl:   PRN Meds:.  Allergies   Allergen Reactions   • Keflex [Cephalexin]    • Latex    • Amoxicillin-Pot Clavulanate Diarrhea     Social History     Social History   • Marital status: Single     Spouse name: N/A   • Number of children: N/A   • Years of education: N/A     Occupational History   • Not on file.     Social History Main Topics   • Smoking status: Never Smoker   • Smokeless tobacco: Never Used   • Alcohol use Yes      Comment: social   • Drug use: No   • Sexual activity: Defer     Other Topics Concern   • Not on file     Social History Narrative     Family History   Problem Relation Age of Onset   • Heart disease Other    • Hypertension Mother    • Heart disease Father    • Diabetes Father    • Cancer Maternal Aunt      Breast   • Diabetes Maternal Aunt    • Lupus Paternal Aunt    • Cancer Maternal Grandmother    • Anemia Cousin    • Cancer Cousin    • Cancer Cousin      leukemia     Review of Systems   Constitutional: Positive for unexpected weight change. Negative for appetite change.   Gastrointestinal: Negative for abdominal pain, blood in stool, constipation, diarrhea, nausea and vomiting.   All other systems reviewed and are negative.    Vitals:    02/06/18 1429   BP: 124/82   Temp: 97.8 °F (36.6 °C)     Last Weight    02/06/18  1429   Weight: 109 kg (241 lb 6.4 oz)     Physical Exam   Constitutional: She appears well-developed and well-nourished.    HENT:   Head: Normocephalic and atraumatic.   Eyes: No scleral icterus.   Pulmonary/Chest: Effort normal.   Abdominal: She exhibits no distension.   Neurological: She is alert.   Skin: Skin is warm and dry.   Psychiatric: She has a normal mood and affect.     No images are attached to the encounter.  Diagnoses and all orders for this visit:    Esophageal dysphagia    Gastroesophageal reflux disease with esophagitis    Hyperplastic polyp of transverse colon    Other orders  -     omeprazole (PRILOSEC) 40 MG capsule; Take 1 capsule by mouth Daily.       Plan:  She thinks she will be more compliant if medication is once daily - will try omeprazole 40 mg in the morning.    Discussed diet modification  Repeat c/s 2022

## 2018-08-15 ENCOUNTER — OFFICE VISIT (OUTPATIENT)
Dept: GASTROENTEROLOGY | Facility: CLINIC | Age: 53
End: 2018-08-15

## 2018-08-15 VITALS
HEIGHT: 67 IN | TEMPERATURE: 98.6 F | BODY MASS INDEX: 36.63 KG/M2 | SYSTOLIC BLOOD PRESSURE: 124 MMHG | WEIGHT: 233.4 LBS | DIASTOLIC BLOOD PRESSURE: 82 MMHG

## 2018-08-15 DIAGNOSIS — K63.5 HYPERPLASTIC POLYP OF TRANSVERSE COLON: ICD-10-CM

## 2018-08-15 DIAGNOSIS — R13.19 ESOPHAGEAL DYSPHAGIA: ICD-10-CM

## 2018-08-15 DIAGNOSIS — E73.9 LACTOSE INTOLERANCE: ICD-10-CM

## 2018-08-15 DIAGNOSIS — K21.00 GASTROESOPHAGEAL REFLUX DISEASE WITH ESOPHAGITIS: Primary | ICD-10-CM

## 2018-08-15 PROCEDURE — 99213 OFFICE O/P EST LOW 20 MIN: CPT | Performed by: INTERNAL MEDICINE

## 2018-08-15 RX ORDER — OMEPRAZOLE 40 MG/1
40 CAPSULE, DELAYED RELEASE ORAL DAILY
Qty: 90 CAPSULE | Refills: 3 | Status: SHIPPED | OUTPATIENT
Start: 2018-08-15 | End: 2019-09-26 | Stop reason: SDUPTHER

## 2018-08-15 NOTE — PROGRESS NOTES
Chief Complaint   Patient presents with   • Follow-up   • Difficulty Swallowing   • Diarrhea       Kylie Bowie is a  53 y.o. female here for a follow up visit for GERD.   She has been using rolaids only - never got the PPI filled.  She has been having continued symptoms.  She reports some dysphagia - last egd  with La Class B esophagitis.  She has some abdominal discomfort at times - known fibroids.  Worse prior to menstrual cycles.  She reports more frequent BMs prior to menstrual cycles.  Only loose with she drinks dairy.        HPI  Past Medical History:   Diagnosis Date   • Arthritis    • Asthma    • Beta thalassemia trait    • Factor V Leiden mutation (CMS/HCC)     HETEROZYGOUS   • History of DVT of lower extremity    • History of transfusion    • Hypertension    • Leiomyoma    • PONV (postoperative nausea and vomiting)    • Thrombophlebitis     Septic     Past Surgical History:   Procedure Laterality Date   • ANKLE SURGERY Left    •  SECTION  2003   • COLONOSCOPY N/A 2017    Two 2-3mm polypsin the sigmoid and in the transverse colon, NBIH.  PATH: Hyperplastic polyps   • DIAGNOSTIC LAPAROSCOPY EXPLORATORY LAPAROTOMY  2000   • ENDOSCOPY N/A 2017    LA Grade B reflux esophagitis, small HH, erythematous mucosa in the antrum.  PATH: Focal minimal chronic inflammation   • FOOT SURGERY     • HYSTEROTOMY  1999   • MYOMECTOMY         Current Outpatient Prescriptions:   •  albuterol (PROVENTIL HFA;VENTOLIN HFA) 108 (90 BASE) MCG/ACT inhaler, Inhale 2 puffs every 4 (four) hours as needed for wheezing., Disp: , Rfl:   •  amLODIPine (NORVASC) 10 MG tablet, Take 10 mg by mouth daily., Disp: , Rfl:   •  atorvastatin (LIPITOR) 10 MG tablet, Take 10 mg by mouth Daily., Disp: , Rfl:   •  lisinopril (PRINIVIL,ZESTRIL) 20 MG tablet, Take 20 mg by mouth daily., Disp: , Rfl:   •  meloxicam (MOBIC) 15 MG tablet, Take 15 mg by mouth., Disp: , Rfl:   •  aspirin 81 MG EC tablet, Take 1  tablet by mouth Daily., Disp: 30 tablet, Rfl: 11  •  Diclofenac Sodium 1.5 % solution, Place  on the skin., Disp: , Rfl:   •  ferrous sulfate 325 (65 FE) MG tablet, Take 1 tablet by mouth Daily With Breakfast., Disp: 30 tablet, Rfl: 11  •  fluticasone (FLONASE) 50 MCG/ACT nasal spray, 2 sprays into each nostril daily. Administer 2 sprays in each nostril for each dose., Disp: , Rfl:   •  montelukast (SINGULAIR) 10 MG tablet, Take 10 mg by mouth every night., Disp: , Rfl:   •  naproxen (NAPROSYN) 500 MG tablet, , Disp: , Rfl:   •  omeprazole (PRILOSEC) 40 MG capsule, Take 1 capsule by mouth Daily., Disp: 90 capsule, Rfl: 3  •  traMADol (ULTRAM) 50 MG tablet, , Disp: , Rfl:   PRN Meds:.  Allergies   Allergen Reactions   • Keflex [Cephalexin]    • Latex    • Amoxicillin-Pot Clavulanate Diarrhea     Social History     Social History   • Marital status: Single     Spouse name: N/A   • Number of children: N/A   • Years of education: N/A     Occupational History   • Not on file.     Social History Main Topics   • Smoking status: Never Smoker   • Smokeless tobacco: Never Used   • Alcohol use Yes      Comment: social   • Drug use: No   • Sexual activity: Defer     Other Topics Concern   • Not on file     Social History Narrative   • No narrative on file     Family History   Problem Relation Age of Onset   • Heart disease Other    • Hypertension Mother    • Heart disease Father    • Diabetes Father    • Cancer Maternal Aunt         Breast   • Diabetes Maternal Aunt    • Lupus Paternal Aunt    • Cancer Maternal Grandmother    • Anemia Cousin    • Cancer Cousin    • Cancer Cousin         leukemia     Review of Systems   Constitutional: Negative for appetite change and unexpected weight change.   HENT: Positive for trouble swallowing.    Gastrointestinal: Negative for abdominal pain, blood in stool, nausea and vomiting.   All other systems reviewed and are negative.    Vitals:    08/15/18 1423   BP: 124/82   Temp: 98.6 °F (37 °C)      1    08/15/18  1423   Weight: 106 kg (233 lb 6.4 oz)     Physical Exam   Constitutional: She appears well-developed and well-nourished.   HENT:   Head: Normocephalic and atraumatic.   Eyes: No scleral icterus.   Abdominal: Soft. She exhibits no distension and no mass. There is no tenderness.   Neurological: She is alert.   Skin: Skin is warm and dry.   Psychiatric: She has a normal mood and affect.     No images are attached to the encounter.  Diagnoses and all orders for this visit:    Gastroesophageal reflux disease with esophagitis    Hyperplastic polyp of transverse colon    Esophageal dysphagia    Lactose intolerance    Other orders  -     omeprazole (PRILOSEC) 40 MG capsule; Take 1 capsule by mouth Daily.    Plan:  - start PPI daily  - watch diet - resume efforts at weight loss  - 6 month f/u

## 2019-04-25 ENCOUNTER — LAB (OUTPATIENT)
Dept: LAB | Facility: HOSPITAL | Age: 54
End: 2019-04-25

## 2019-04-25 ENCOUNTER — OFFICE VISIT (OUTPATIENT)
Dept: ONCOLOGY | Facility: CLINIC | Age: 54
End: 2019-04-25

## 2019-04-25 VITALS
HEART RATE: 82 BPM | WEIGHT: 236.5 LBS | DIASTOLIC BLOOD PRESSURE: 81 MMHG | HEIGHT: 67 IN | TEMPERATURE: 98.2 F | SYSTOLIC BLOOD PRESSURE: 135 MMHG | BODY MASS INDEX: 37.12 KG/M2 | OXYGEN SATURATION: 98 % | RESPIRATION RATE: 18 BRPM

## 2019-04-25 DIAGNOSIS — D50.0 IRON DEFICIENCY ANEMIA DUE TO CHRONIC BLOOD LOSS: Primary | ICD-10-CM

## 2019-04-25 DIAGNOSIS — K13.21 LEUKOPLAKIA OF ORAL MUCOSA: ICD-10-CM

## 2019-04-25 DIAGNOSIS — D68.51 FACTOR V LEIDEN MUTATION (HCC): ICD-10-CM

## 2019-04-25 DIAGNOSIS — Z86.718 HISTORY OF DVT OF LOWER EXTREMITY: ICD-10-CM

## 2019-04-25 DIAGNOSIS — D56.3 BETA THALASSEMIA TRAIT: ICD-10-CM

## 2019-04-25 LAB
BASOPHILS # BLD AUTO: 0.03 10*3/MM3 (ref 0–0.2)
BASOPHILS NFR BLD AUTO: 0.4 % (ref 0–1.5)
DEPRECATED RDW RBC AUTO: 43.7 FL (ref 37–54)
EOSINOPHIL # BLD AUTO: 0.06 10*3/MM3 (ref 0–0.4)
EOSINOPHIL NFR BLD AUTO: 0.8 % (ref 0.3–6.2)
ERYTHROCYTE [DISTWIDTH] IN BLOOD BY AUTOMATED COUNT: 14.1 % (ref 12.3–15.4)
FERRITIN SERPL-MCNC: 46 NG/ML (ref 11–207)
HCT VFR BLD AUTO: 35.2 % (ref 34–46.6)
HGB BLD-MCNC: 11.6 G/DL (ref 12–15.9)
IMM GRANULOCYTES # BLD AUTO: 0.02 10*3/MM3 (ref 0–0.05)
IMM GRANULOCYTES NFR BLD AUTO: 0.3 % (ref 0–0.5)
IRON 24H UR-MRATE: 47 MCG/DL (ref 37–145)
IRON SATN MFR SERPL: 12 % (ref 14–48)
LYMPHOCYTES # BLD AUTO: 2.24 10*3/MM3 (ref 0.7–3.1)
LYMPHOCYTES NFR BLD AUTO: 30.5 % (ref 19.6–45.3)
MCH RBC QN AUTO: 28.2 PG (ref 26.6–33)
MCHC RBC AUTO-ENTMCNC: 33 G/DL (ref 31.5–35.7)
MCV RBC AUTO: 85.6 FL (ref 79–97)
MONOCYTES # BLD AUTO: 0.66 10*3/MM3 (ref 0.1–0.9)
MONOCYTES NFR BLD AUTO: 9 % (ref 5–12)
NEUTROPHILS # BLD AUTO: 4.33 10*3/MM3 (ref 1.7–7)
NEUTROPHILS NFR BLD AUTO: 59 % (ref 42.7–76)
NRBC BLD AUTO-RTO: 0 /100 WBC (ref 0–0.2)
PLATELET # BLD AUTO: 203 10*3/MM3 (ref 140–450)
PMV BLD AUTO: 10.4 FL (ref 6–12)
RBC # BLD AUTO: 4.11 10*6/MM3 (ref 3.77–5.28)
TIBC SERPL-MCNC: 381 MCG/DL (ref 249–505)
TRANSFERRIN SERPL-MCNC: 272 MG/DL (ref 200–360)
WBC NRBC COR # BLD: 7.34 10*3/MM3 (ref 3.4–10.8)

## 2019-04-25 PROCEDURE — 36415 COLL VENOUS BLD VENIPUNCTURE: CPT | Performed by: INTERNAL MEDICINE

## 2019-04-25 PROCEDURE — 84466 ASSAY OF TRANSFERRIN: CPT | Performed by: INTERNAL MEDICINE

## 2019-04-25 PROCEDURE — 82728 ASSAY OF FERRITIN: CPT | Performed by: INTERNAL MEDICINE

## 2019-04-25 PROCEDURE — 85025 COMPLETE CBC W/AUTO DIFF WBC: CPT | Performed by: INTERNAL MEDICINE

## 2019-04-25 PROCEDURE — 99214 OFFICE O/P EST MOD 30 MIN: CPT | Performed by: INTERNAL MEDICINE

## 2019-04-25 PROCEDURE — 83540 ASSAY OF IRON: CPT | Performed by: INTERNAL MEDICINE

## 2019-04-25 RX ORDER — FERROUS SULFATE 325(65) MG
325 TABLET ORAL
Qty: 30 TABLET | Refills: 11 | Status: SHIPPED | OUTPATIENT
Start: 2019-04-25 | End: 2020-09-22 | Stop reason: SDUPTHER

## 2019-04-25 RX ORDER — ASPIRIN 81 MG/1
81 TABLET ORAL DAILY
Qty: 30 TABLET | Refills: 11 | Status: SHIPPED | OUTPATIENT
Start: 2019-04-25 | End: 2020-07-30

## 2019-04-25 NOTE — PROGRESS NOTES
Subjective     CHIEF COMPLAINT:      Chief Complaint   Patient presents with   • Annual Exam     questions about dental procedure   • Med Refill     ferrous sulfate and aspirin       HISTORY OF PRESENT ILLNESS:     Kylie Bowie is a 54 y.o. female patient who returns today for follow up on her anemia and history of DVT of the lower extremities. She is 6 months late on her annual follow up visit. She states that she is not taking the iron or the ASA at present. She reports fatigue.     The patient states that she has arthritis in the shoulders and left knee. She has a meniscal tear in the left knee. She has bilateral carpal tunnel syndrome.     Patient states she was found to have a possible pre-malignant change in her mouth on screening exam done at her work.         REVIEW OF SYSTEMS:  Review of Systems   Constitutional: Positive for fatigue. Negative for chills, fever and unexpected weight change.   HENT: Negative for mouth sores, nosebleeds, sore throat and voice change.    Eyes: Negative for visual disturbance.   Respiratory: Positive for cough, choking and shortness of breath.    Cardiovascular: Negative for chest pain and leg swelling.   Gastrointestinal: Negative for abdominal pain, blood in stool, constipation, diarrhea, nausea and vomiting.   Genitourinary: Negative for dysuria, frequency and hematuria.   Musculoskeletal: Positive for joint swelling. Negative for arthralgias and back pain.   Skin: Negative for rash.   Neurological: Positive for numbness. Negative for dizziness and headaches.   Hematological: Negative for adenopathy. Does not bruise/bleed easily.   Psychiatric/Behavioral: Negative for dysphoric mood. The patient is not nervous/anxious.      I verified the ROS obtained by the MA.      Past Medical History:   Diagnosis Date   • Arthritis    • Asthma    • Beta thalassemia trait    • Factor V Leiden mutation (CMS/HCC)     HETEROZYGOUS   • GERD (gastroesophageal reflux disease)    • H/O Humeral  head fracture    • H/O Left rotator cuff tear    • History of DVT of lower extremity    • History of iron deficiency anemia    • History of transfusion    • Hypertension    • Leiomyoma    • PONV (postoperative nausea and vomiting)    • Thrombophlebitis     Septic       Past Surgical History:   Procedure Laterality Date   • ANKLE SURGERY Left    •  SECTION  2003   • COLONOSCOPY N/A 2017    Two 2-3mm polypsin the sigmoid and in the transverse colon, NBIH.  PATH: Hyperplastic polyps   • DIAGNOSTIC LAPAROSCOPY EXPLORATORY LAPAROTOMY  2000   • ENDOSCOPY N/A 2017    LA Grade B reflux esophagitis, small HH, erythematous mucosa in the antrum.  PATH: Focal minimal chronic inflammation   • FOOT SURGERY Left     Kidner procedure with excision of accessory bone   • HYSTEROTOMY  1999   • MYOMECTOMY         Cancer-related family history includes Cancer in her cousin, cousin, maternal aunt, and maternal grandmother.  Social History     Tobacco Use   • Smoking status: Never Smoker   • Smokeless tobacco: Never Used   Substance Use Topics   • Alcohol use: Yes     Comment: social       MEDICATIONS:    Current Outpatient Medications:   •  albuterol (PROVENTIL HFA;VENTOLIN HFA) 108 (90 BASE) MCG/ACT inhaler, Inhale 2 puffs every 4 (four) hours as needed for wheezing., Disp: , Rfl:   •  amLODIPine (NORVASC) 10 MG tablet, Take 10 mg by mouth daily., Disp: , Rfl:   •  aspirin 81 MG EC tablet, Take 1 tablet by mouth Daily., Disp: 30 tablet, Rfl: 11  •  atorvastatin (LIPITOR) 10 MG tablet, Take 10 mg by mouth Daily., Disp: , Rfl:   •  Cyclobenzaprine HCl (FLEXERIL PO), Take  by mouth As Needed., Disp: , Rfl:   •  Diclofenac Sodium 1.5 % solution, Place  on the skin., Disp: , Rfl:   •  fluticasone (FLONASE) 50 MCG/ACT nasal spray, 2 sprays into each nostril daily. Administer 2 sprays in each nostril for each dose., Disp: , Rfl:   •  lisinopril (PRINIVIL,ZESTRIL) 20 MG tablet, Take 20 mg by  "mouth daily., Disp: , Rfl:   •  meloxicam (MOBIC) 15 MG tablet, Take 15 mg by mouth., Disp: , Rfl:   •  omeprazole (PRILOSEC) 40 MG capsule, Take 1 capsule by mouth Daily., Disp: 90 capsule, Rfl: 3  •  ferrous sulfate 325 (65 FE) MG tablet, Take 1 tablet by mouth Daily With Breakfast., Disp: 30 tablet, Rfl: 11  •  traMADol (ULTRAM) 50 MG tablet, , Disp: , Rfl:     ALLERGIES:  Allergies   Allergen Reactions   • Keflex [Cephalexin]    • Latex    • Amoxicillin-Pot Clavulanate Diarrhea         Objective   VITAL SIGNS:     Vitals:    04/25/19 1648   BP: 135/81   Pulse: 82   Resp: 18   Temp: 98.2 °F (36.8 °C)   TempSrc: Oral   SpO2: 98%   Weight: 107 kg (236 lb 8 oz)   Height: 170.2 cm (67\")  Comment: new ht   PainSc: 6  Comment: joint pain     Body mass index is 37.04 kg/m².     Wt Readings from Last 3 Encounters:   04/25/19 107 kg (236 lb 8 oz)   08/15/18 106 kg (233 lb 6.4 oz)   02/06/18 109 kg (241 lb 6.4 oz)       PHYSICAL EXAMINATION:  GENERAL:  The patient appears in good general condition, not in acute distress.  SKIN: Warm and dry. No skin rashes, ecchymosis or petechiae.  HEAD:  Normocephalic.  EYES:  No Jaundice. No Pallor. Pupils equal. EOMI.  MOUTH: No Ulcers. No Thrush. No Exudates. No suspicious masses or lesions.   NECK:  Supple with Good ROM. No Thyromegaly. No Masses.  LYMPHATICS:  No cervical or supraclavicular lymphadenopathy.  CHEST: Normal respiratory effort. Lungs clear to auscultation.   CARDIAC:  Trace distal leg edema.  EXTREMITIES:  Left leg is slightly larger than the right.  No Calf tenderness.  NEUROLOGICAL:  No Focal neurological deficits.         DIAGNOSTIC DATA:     Results from last 7 days   Lab Units 04/25/19  1634   WBC 10*3/mm3 7.34   NEUTROS ABS 10*3/mm3 4.33   HEMOGLOBIN g/dL 11.6*   HEMATOCRIT % 35.2   PLATELETS 10*3/mm3 203     Results from last 7 days   Lab Units 04/25/19  1703   FERRITIN ng/mL 46.00   IRON mcg/dL 47   TIBC mcg/dL 381         Assessment/Plan   1.  Factor V Leiden " heterozygous mutation with history of left lower extremity DVT that developed after a myomectomy or D and C in 1999. She had  A foot surgery and was placed on Xarelto 10 mg daily after the surgery. I recommended starting back on her ASA. If she needs to have a foot or knee surgery, I would recommend Xarelto or Lovenox for prophylaxis     No sings of DVT on exam today.     2.  Anemia secondary to iron deficiency in a patient with Beta thalassemia trait.  She has not been taking the oral iron since the last visit and her anemia has worsened.     3.  Patient reported to have changes on oral exam at work. I did not see any lesions or leukoplakia on exam today.    PLAN:    1.  Restart back on ASA 81 mg a day.  2.  Restart back on Ferrous sulfate 325 mg a day.  3.  Refer to ENT for evaluation of the reported abnormality.  4.  Follow up in 1 year with a CBC, ferritin and iron panel.  5.  I recommended no additional prophylaxis if she has the carpal tunnel surgery done. If she has left knee or foot surgery, I would recommend switching to Loveox 40 mg SQ daily or Xarelto 10 mg daily x 6 weeks.         Merly Giang MD  04/25/19

## 2019-09-26 ENCOUNTER — OFFICE VISIT (OUTPATIENT)
Dept: INTERNAL MEDICINE | Facility: CLINIC | Age: 54
End: 2019-09-26

## 2019-09-26 VITALS
SYSTOLIC BLOOD PRESSURE: 118 MMHG | TEMPERATURE: 98.6 F | BODY MASS INDEX: 35.56 KG/M2 | HEIGHT: 67 IN | HEART RATE: 90 BPM | OXYGEN SATURATION: 99 % | WEIGHT: 226.6 LBS | DIASTOLIC BLOOD PRESSURE: 80 MMHG

## 2019-09-26 DIAGNOSIS — K21.9 GASTROESOPHAGEAL REFLUX DISEASE, ESOPHAGITIS PRESENCE NOT SPECIFIED: ICD-10-CM

## 2019-09-26 DIAGNOSIS — Z00.00 ANNUAL PHYSICAL EXAM: Primary | ICD-10-CM

## 2019-09-26 DIAGNOSIS — R39.9 URINARY TRACT INFECTION SYMPTOMS: ICD-10-CM

## 2019-09-26 DIAGNOSIS — I10 ESSENTIAL HYPERTENSION: ICD-10-CM

## 2019-09-26 PROBLEM — M75.42 IMPINGEMENT SYNDROME OF BOTH SHOULDERS: Status: ACTIVE | Noted: 2018-06-25

## 2019-09-26 PROBLEM — M24.111 LABRAL TEAR OF SHOULDER, DEGENERATIVE, RIGHT: Status: ACTIVE | Noted: 2018-10-10

## 2019-09-26 PROBLEM — M75.22 BILATERAL BICEPS TENDONITIS: Status: ACTIVE | Noted: 2018-10-10

## 2019-09-26 PROBLEM — M75.41 IMPINGEMENT SYNDROME OF BOTH SHOULDERS: Status: ACTIVE | Noted: 2018-06-25

## 2019-09-26 PROBLEM — M19.011 OSTEOARTHRITIS OF BOTH ACROMIOCLAVICULAR JOINTS: Status: ACTIVE | Noted: 2018-10-10

## 2019-09-26 PROBLEM — M75.102 LEFT ROTATOR CUFF TEAR: Status: ACTIVE | Noted: 2018-06-25

## 2019-09-26 PROBLEM — M75.21 BILATERAL BICEPS TENDONITIS: Status: ACTIVE | Noted: 2018-10-10

## 2019-09-26 PROBLEM — M19.012 OSTEOARTHRITIS OF BOTH ACROMIOCLAVICULAR JOINTS: Status: ACTIVE | Noted: 2018-10-10

## 2019-09-26 PROBLEM — M19.019 AC (ACROMIOCLAVICULAR) JOINT ARTHRITIS: Status: ACTIVE | Noted: 2018-06-25

## 2019-09-26 PROBLEM — R87.619 ENDOMETRIAL CELLS ON CERVICAL PAP SMEAR INCONSISTENT W/LMP: Status: ACTIVE | Noted: 2018-05-24

## 2019-09-26 LAB
BACTERIA UR QL AUTO: ABNORMAL /HPF
BILIRUB UR QL STRIP: NEGATIVE
CLARITY UR: CLEAR
COLOR UR: YELLOW
GLUCOSE UR STRIP-MCNC: NEGATIVE MG/DL
HGB UR QL STRIP.AUTO: NEGATIVE
HYALINE CASTS UR QL AUTO: ABNORMAL /LPF
KETONES UR QL STRIP: NEGATIVE
LEUKOCYTE ESTERASE UR QL STRIP.AUTO: NEGATIVE
NITRITE UR QL STRIP: NEGATIVE
PH UR STRIP.AUTO: 7 [PH] (ref 5–8)
PROT UR QL STRIP: NEGATIVE
RBC # UR: ABNORMAL /HPF
REF LAB TEST METHOD: ABNORMAL
SP GR UR STRIP: 1.01 (ref 1–1.03)
SQUAMOUS #/AREA URNS HPF: ABNORMAL /HPF
UROBILINOGEN UR QL STRIP: NORMAL
WBC UR QL AUTO: ABNORMAL /HPF

## 2019-09-26 PROCEDURE — 81001 URINALYSIS AUTO W/SCOPE: CPT | Performed by: NURSE PRACTITIONER

## 2019-09-26 PROCEDURE — 93000 ELECTROCARDIOGRAM COMPLETE: CPT | Performed by: NURSE PRACTITIONER

## 2019-09-26 PROCEDURE — 99386 PREV VISIT NEW AGE 40-64: CPT | Performed by: NURSE PRACTITIONER

## 2019-09-26 RX ORDER — OMEPRAZOLE 40 MG/1
40 CAPSULE, DELAYED RELEASE ORAL DAILY
Qty: 90 CAPSULE | Refills: 1 | Status: SHIPPED | OUTPATIENT
Start: 2019-09-26 | End: 2020-02-14 | Stop reason: SDUPTHER

## 2019-09-26 RX ORDER — FLUTICASONE PROPIONATE 50 MCG
2 SPRAY, SUSPENSION (ML) NASAL DAILY
Qty: 3 BOTTLE | Refills: 1 | Status: SHIPPED | OUTPATIENT
Start: 2019-09-26

## 2019-09-26 RX ORDER — MONTELUKAST SODIUM 10 MG/1
10 TABLET ORAL NIGHTLY
Qty: 90 TABLET | Refills: 1 | Status: SHIPPED | OUTPATIENT
Start: 2019-09-26

## 2019-09-26 NOTE — PROGRESS NOTES
Subjective   Kylie Bowie is a 54 y.o. female.     .Well Adult Physical   Patient here for a comprehensive physical exam.The patient reports no problems    Do you take any herbs or supplements that were not prescribed by a doctor? no   Are you taking calcium supplements? no   Are you taking aspirin daily? yes     History:  LMP: irregular  recorded.  Menopause at  N/a years  Last pap date:   Abnormal pap? no  : 4  Para: 1        She works full time at [employer]. She does not exercise routinely. She is eating poor. She is over due for dental exam and vision exam.  She is due for mammogram. She was recently treated for UTI. She was given cipro. She has three more doses level. She has chronic history of HTN, asthma, history of DVT, factor v Leiden, iron deficiency, uterine fibroids.     She sees cardiologist routinely. She gets mammogram at [place].     She does have chronic left knee, shoulder and hand pain that she sees workers comp for.     She also makes mention of recently being treated for UTI at [facility] (this past weekend) and would like urine rechecked today.          The following portions of the patient's history were reviewed and updated as appropriate: allergies, current medications, past family history, past medical history, past social history, past surgical history and problem list.    Review of Systems   Constitutional: Negative for appetite change, chills, fatigue and fever (resolved ).   HENT: Negative for congestion, ear pain, hearing loss, mouth sores, nosebleeds, postnasal drip, rhinorrhea, sinus pressure, sneezing, sore throat, tinnitus, trouble swallowing and voice change.    Eyes: Negative for visual disturbance.   Respiratory: Positive for chest tightness (with asthma ) and shortness of breath. Negative for cough and wheezing.    Cardiovascular: Negative for chest pain, palpitations and leg swelling.   Gastrointestinal: Positive for nausea. Negative for abdominal pain, anal  bleeding, blood in stool, constipation, diarrhea and vomiting.   Endocrine: Negative for cold intolerance, heat intolerance, polydipsia, polyphagia and polyuria.   Genitourinary: Positive for frequency. Negative for dysuria, hematuria and urgency.   Musculoskeletal: Negative for arthralgias, back pain, gait problem, joint swelling, myalgias, neck pain and neck stiffness.   Skin: Negative for color change and rash.        NEGATIVE BREAST MASS, BREAST PAIN, NIPPLE DISCHARGE, SKIN CHANGES, OR LUMP IN ARMPIT   Neurological: Negative for dizziness, tremors, seizures, syncope, speech difficulty, weakness, numbness and headaches.   Hematological: Negative for adenopathy. Does not bruise/bleed easily.   Psychiatric/Behavioral: Negative for behavioral problems, confusion, decreased concentration, sleep disturbance and suicidal ideas. The patient is not nervous/anxious.        Objective   Physical Exam   Constitutional: She appears well-developed and well-nourished.   HENT:   Right Ear: Hearing, tympanic membrane, external ear and ear canal normal.   Left Ear: Hearing, tympanic membrane, external ear and ear canal normal.   Nose: Nose normal. Right sinus exhibits no maxillary sinus tenderness and no frontal sinus tenderness. Left sinus exhibits no maxillary sinus tenderness and no frontal sinus tenderness.   Mouth/Throat: Uvula is midline, oropharynx is clear and moist and mucous membranes are normal. No tonsillar exudate.   Eyes: Conjunctivae, EOM and lids are normal. Pupils are equal, round, and reactive to light.   Neck: Trachea normal. Neck supple. No JVD present. Carotid bruit is not present. No tracheal deviation present. No thyroid mass and no thyromegaly present.   Cardiovascular: Normal rate, regular rhythm, S1 normal and S2 normal. Exam reveals no gallop and no friction rub.   No murmur heard.  Pulses:       Carotid pulses are 2+ on the right side, and 2+ on the left side.       Radial pulses are 2+ on the right  side, and 2+ on the left side.        Dorsalis pedis pulses are 2+ on the right side, and 2+ on the left side.        Posterior tibial pulses are 2+ on the right side, and 2+ on the left side.   Repeat bp left arm 118/70  No pedal edema   bilateral varicose veins   Pulmonary/Chest: Effort normal and breath sounds normal. Chest wall is not dull to percussion. Right breast exhibits no inverted nipple, no mass, no nipple discharge, no skin change and no tenderness. Left breast exhibits no inverted nipple, no mass, no nipple discharge, no skin change and no tenderness.   Abdominal: Soft. Normal aorta and bowel sounds are normal. She exhibits no abdominal bruit. There is no hepatosplenomegaly. There is no tenderness. There is no rebound and no guarding. No hernia.   Genitourinary:   Genitourinary Comments: Performed by GYN    Musculoskeletal: Normal range of motion. She exhibits no edema.   (-)SLR   Scar noted to left knee    Lymphadenopathy:        Head (right side): No submental, no submandibular, no tonsillar, no preauricular, no posterior auricular and no occipital adenopathy present.        Head (left side): No submental, no submandibular, no tonsillar, no preauricular, no posterior auricular and no occipital adenopathy present.     She has no cervical adenopathy.        Right: No supraclavicular adenopathy present.        Left: No supraclavicular adenopathy present.   Neurological: She is alert. She has normal strength. No cranial nerve deficit or sensory deficit. She displays a negative Romberg sign.   Reflex Scores:       Patellar reflexes are 1+ on the right side and 1+ on the left side.  Skin: Skin is warm and dry.   Psychiatric: She has a normal mood and affect. Her speech is normal and behavior is normal. Judgment and thought content normal. Cognition and memory are normal.   Nursing note and vitals reviewed.      Assessment/Plan   Kylie was seen today for establish care.    Diagnoses and all orders for this  visit:    Annual physical exam  -     Cancel: ECG 12 Lead  -     TSH Rfx On Abnormal To Free T4; Future  -     Comprehensive Metabolic Panel; Future  -     CBC No Differential; Future  -     ECG 12 Lead    Urinary tract infection symptoms  -     Urinalysis With Microscopic If Indicated (No Culture) - Urine, Clean Catch; Future  -     Urinalysis With Microscopic If Indicated (No Culture) - Urine, Clean Catch  -     Urinalysis, Microscopic Only - Urine, Clean Catch; Future  -     Urinalysis, Microscopic Only - Urine, Clean Catch    Essential hypertension  Comments:  stable   Orders:  -     ECG 12 Lead    Gastroesophageal reflux disease, esophagitis presence not specified  Comments:  takes prilosec     Other orders  -     omeprazole (PRILOSEC) 40 MG capsule; Take 1 capsule by mouth Daily.  -     fluticasone (FLONASE) 50 MCG/ACT nasal spray; 2 sprays into the nostril(s) as directed by provider Daily. Administer 2 sprays in each nostril for each dose.  -     montelukast (SINGULAIR) 10 MG tablet; Take 1 tablet by mouth Every Night.      ECG 12 Lead  Date/Time: 9/26/2019 2:42 PM  Performed by: Lauren Arevalo APRN  Authorized by: Lauren Arevalo APRN   Previous ECG: no previous ECG available  Rhythm: sinus rhythm  Rate: normal  Conduction: conduction normal  ST Segments: ST segments normal  QRS axis: normal    Clinical impression: normal ECG            She was advised to flu shot and shingrix vaccination. She will get mammogram.

## 2020-02-14 ENCOUNTER — OFFICE VISIT (OUTPATIENT)
Dept: INTERNAL MEDICINE | Facility: CLINIC | Age: 55
End: 2020-02-14

## 2020-02-14 VITALS
OXYGEN SATURATION: 100 % | HEART RATE: 81 BPM | HEIGHT: 67 IN | DIASTOLIC BLOOD PRESSURE: 82 MMHG | WEIGHT: 234.8 LBS | BODY MASS INDEX: 36.85 KG/M2 | SYSTOLIC BLOOD PRESSURE: 126 MMHG

## 2020-02-14 DIAGNOSIS — K21.9 GASTROESOPHAGEAL REFLUX DISEASE, ESOPHAGITIS PRESENCE NOT SPECIFIED: ICD-10-CM

## 2020-02-14 DIAGNOSIS — N93.9 VAGINAL SPOTTING: ICD-10-CM

## 2020-02-14 DIAGNOSIS — I10 ESSENTIAL HYPERTENSION: Primary | ICD-10-CM

## 2020-02-14 LAB
ALBUMIN SERPL-MCNC: 4 G/DL (ref 3.5–5.2)
ALBUMIN/GLOB SERPL: 1.3 G/DL
ALP SERPL-CCNC: 68 U/L (ref 39–117)
ALT SERPL-CCNC: 21 U/L (ref 1–33)
AST SERPL-CCNC: 30 U/L (ref 1–32)
BILIRUB SERPL-MCNC: 0.3 MG/DL (ref 0.2–1.2)
BUN SERPL-MCNC: 24 MG/DL (ref 6–20)
BUN/CREAT SERPL: 24.2 (ref 7–25)
CALCIUM SERPL-MCNC: 8.6 MG/DL (ref 8.6–10.5)
CHLORIDE SERPL-SCNC: 101 MMOL/L (ref 98–107)
CHOLEST SERPL-MCNC: 191 MG/DL (ref 0–200)
CO2 SERPL-SCNC: 29 MMOL/L (ref 22–29)
CREAT SERPL-MCNC: 0.99 MG/DL (ref 0.57–1)
GLOBULIN SER CALC-MCNC: 3.2 GM/DL
GLUCOSE SERPL-MCNC: 96 MG/DL (ref 65–99)
HDLC SERPL-MCNC: 72 MG/DL (ref 40–60)
LDLC SERPL CALC-MCNC: 110 MG/DL (ref 0–100)
POTASSIUM SERPL-SCNC: 3.9 MMOL/L (ref 3.5–5.2)
PROT SERPL-MCNC: 7.2 G/DL (ref 6–8.5)
SODIUM SERPL-SCNC: 140 MMOL/L (ref 136–145)
TRIGL SERPL-MCNC: 47 MG/DL (ref 0–150)
TSH SERPL DL<=0.005 MIU/L-ACNC: 1.03 UIU/ML (ref 0.27–4.2)
VLDLC SERPL CALC-MCNC: 9.4 MG/DL

## 2020-02-14 PROCEDURE — 99214 OFFICE O/P EST MOD 30 MIN: CPT | Performed by: NURSE PRACTITIONER

## 2020-02-14 RX ORDER — OMEPRAZOLE 40 MG/1
40 CAPSULE, DELAYED RELEASE ORAL DAILY
Qty: 30 CAPSULE | Refills: 0 | Status: SHIPPED | OUTPATIENT
Start: 2020-02-14 | End: 2020-04-29

## 2020-02-14 RX ORDER — CHLORTHALIDONE 25 MG/1
TABLET ORAL
COMMUNITY
Start: 2020-01-11

## 2020-02-14 NOTE — PROGRESS NOTES
Subjective   Kylie Bowie is a 55 y.o. female.     She does not monitor her blood pressure routinely. No exercise.     Hypertension   This is a chronic problem. The current episode started more than 1 year ago. The problem is unchanged. The problem is controlled. Pertinent negatives include no anxiety, blurred vision, chest pain, headaches, orthopnea, palpitations, peripheral edema, PND or shortness of breath. Current antihypertension treatment includes diuretics and ACE inhibitors.   Heartburn   She complains of heartburn. She reports no abdominal pain, no belching, no chest pain, no choking, no coughing, no dysphagia, no nausea, no sore throat, no stridor or no wheezing. This is a chronic problem. The problem has been unchanged. The heartburn is located in the substernum. The heartburn is of moderate intensity. The symptoms are aggravated by certain foods. Pertinent negatives include no anemia, fatigue, muscle weakness, orthopnea or weight loss. She has tried a PPI for the symptoms. The treatment provided moderate relief.        The following portions of the patient's history were reviewed and updated as appropriate: allergies, current medications, past family history, past medical history, past social history, past surgical history and problem list.    Review of Systems   Constitutional: Negative for activity change, appetite change, fatigue, fever and weight loss.   HENT: Negative for sore throat.    Eyes: Negative for blurred vision and visual disturbance.   Respiratory: Negative for cough, choking, shortness of breath and wheezing.    Cardiovascular: Negative for chest pain, palpitations, orthopnea, leg swelling and PND.   Gastrointestinal: Positive for heartburn. Negative for abdominal pain, blood in stool, constipation, diarrhea, dysphagia, nausea and vomiting.        Reflux worst due to not taking prilosec   Genitourinary: Positive for vaginal bleeding (about one month ago, has resolved ).    Musculoskeletal: Positive for arthralgias (bilateral knee pain, worst left knee ). Negative for muscle weakness.        Right foot pain   Neurological: Negative for dizziness and headaches.       Objective   Physical Exam   Constitutional: She is oriented to person, place, and time. She appears well-developed and well-nourished.   HENT:   Head: Normocephalic.   Nose: Nose normal.   Neck: Carotid bruit is not present. No thyroid mass and no thyromegaly present.   Cardiovascular: Regular rhythm and normal heart sounds. Exam reveals no S3 and no S4.   No murmur heard.  Repeat bp left arm 138/88  No pedal edema    Pulmonary/Chest: Effort normal and breath sounds normal. She has no decreased breath sounds. She has no wheezes. She has no rhonchi. She has no rales.   Musculoskeletal: She exhibits no edema.   Neurological: She is alert and oriented to person, place, and time. Gait normal.   Skin: Skin is warm and dry.   Psychiatric: She has a normal mood and affect.       Assessment/Plan   Kylie was seen today for hypertension.    Diagnoses and all orders for this visit:    Essential hypertension  Comments:  stable   Orders:  -     Comprehensive Metabolic Panel  -     Lipid Panel  -     TSH Rfx On Abnormal To Free T4    Gastroesophageal reflux disease, esophagitis presence not specified  Comments:  worst not taking prilosec   Orders:  -     omeprazole (PrilOSEC) 40 MG capsule; Take 1 capsule by mouth Daily.    Vaginal spotting  Comments:  intermittent, hx of fibroid   Orders:  -     Ambulatory Referral to Gynecology      She will follow up with ECHO   She will check coverage of shingrix   She needs eye exam

## 2020-04-23 ENCOUNTER — APPOINTMENT (OUTPATIENT)
Dept: LAB | Facility: HOSPITAL | Age: 55
End: 2020-04-23

## 2020-04-29 DIAGNOSIS — K21.9 GASTROESOPHAGEAL REFLUX DISEASE, ESOPHAGITIS PRESENCE NOT SPECIFIED: ICD-10-CM

## 2020-04-29 RX ORDER — OMEPRAZOLE 40 MG/1
CAPSULE, DELAYED RELEASE ORAL
Qty: 30 CAPSULE | Refills: 0 | Status: SHIPPED | OUTPATIENT
Start: 2020-04-29 | End: 2020-08-10

## 2020-07-24 DIAGNOSIS — D50.0 IRON DEFICIENCY ANEMIA DUE TO CHRONIC BLOOD LOSS: Primary | ICD-10-CM

## 2020-07-30 ENCOUNTER — TELEPHONE (OUTPATIENT)
Dept: ONCOLOGY | Facility: CLINIC | Age: 55
End: 2020-07-30

## 2020-07-30 ENCOUNTER — LAB (OUTPATIENT)
Dept: LAB | Facility: HOSPITAL | Age: 55
End: 2020-07-30

## 2020-07-30 ENCOUNTER — OFFICE VISIT (OUTPATIENT)
Dept: ONCOLOGY | Facility: CLINIC | Age: 55
End: 2020-07-30

## 2020-07-30 VITALS
DIASTOLIC BLOOD PRESSURE: 91 MMHG | HEIGHT: 67 IN | BODY MASS INDEX: 37.4 KG/M2 | SYSTOLIC BLOOD PRESSURE: 132 MMHG | WEIGHT: 238.3 LBS | HEART RATE: 85 BPM | OXYGEN SATURATION: 96 % | RESPIRATION RATE: 16 BRPM | TEMPERATURE: 98.2 F

## 2020-07-30 DIAGNOSIS — Z86.718 HISTORY OF DVT OF LOWER EXTREMITY: ICD-10-CM

## 2020-07-30 DIAGNOSIS — D50.0 IRON DEFICIENCY ANEMIA DUE TO CHRONIC BLOOD LOSS: ICD-10-CM

## 2020-07-30 DIAGNOSIS — D56.3 BETA THALASSEMIA TRAIT: ICD-10-CM

## 2020-07-30 DIAGNOSIS — D68.51 FACTOR V LEIDEN MUTATION (HCC): Primary | ICD-10-CM

## 2020-07-30 LAB
BASOPHILS # BLD AUTO: 0.03 10*3/MM3 (ref 0–0.2)
BASOPHILS NFR BLD AUTO: 0.4 % (ref 0–1.5)
DEPRECATED RDW RBC AUTO: 43.3 FL (ref 37–54)
EOSINOPHIL # BLD AUTO: 0.21 10*3/MM3 (ref 0–0.4)
EOSINOPHIL NFR BLD AUTO: 2.9 % (ref 0.3–6.2)
ERYTHROCYTE [DISTWIDTH] IN BLOOD BY AUTOMATED COUNT: 14 % (ref 12.3–15.4)
FERRITIN SERPL-MCNC: 106.8 NG/ML (ref 11–207)
HCT VFR BLD AUTO: 39.7 % (ref 34–46.6)
HGB BLD-MCNC: 13.2 G/DL (ref 12–15.9)
IMM GRANULOCYTES # BLD AUTO: 0.03 10*3/MM3 (ref 0–0.05)
IMM GRANULOCYTES NFR BLD AUTO: 0.4 % (ref 0–0.5)
IRON 24H UR-MRATE: 71 MCG/DL (ref 37–145)
IRON SATN MFR SERPL: 18 % (ref 14–48)
LYMPHOCYTES # BLD AUTO: 2.41 10*3/MM3 (ref 0.7–3.1)
LYMPHOCYTES NFR BLD AUTO: 32.9 % (ref 19.6–45.3)
MCH RBC QN AUTO: 28.4 PG (ref 26.6–33)
MCHC RBC AUTO-ENTMCNC: 33.2 G/DL (ref 31.5–35.7)
MCV RBC AUTO: 85.6 FL (ref 79–97)
MONOCYTES # BLD AUTO: 0.6 10*3/MM3 (ref 0.1–0.9)
MONOCYTES NFR BLD AUTO: 8.2 % (ref 5–12)
NEUTROPHILS NFR BLD AUTO: 4.05 10*3/MM3 (ref 1.7–7)
NEUTROPHILS NFR BLD AUTO: 55.2 % (ref 42.7–76)
NRBC BLD AUTO-RTO: 0 /100 WBC (ref 0–0.2)
PLATELET # BLD AUTO: 257 10*3/MM3 (ref 140–450)
PMV BLD AUTO: 9.8 FL (ref 6–12)
RBC # BLD AUTO: 4.64 10*6/MM3 (ref 3.77–5.28)
TIBC SERPL-MCNC: 398 MCG/DL (ref 249–505)
TRANSFERRIN SERPL-MCNC: 284 MG/DL (ref 200–360)
WBC # BLD AUTO: 7.33 10*3/MM3 (ref 3.4–10.8)

## 2020-07-30 PROCEDURE — 82728 ASSAY OF FERRITIN: CPT

## 2020-07-30 PROCEDURE — 36415 COLL VENOUS BLD VENIPUNCTURE: CPT

## 2020-07-30 PROCEDURE — 84466 ASSAY OF TRANSFERRIN: CPT

## 2020-07-30 PROCEDURE — 83540 ASSAY OF IRON: CPT

## 2020-07-30 PROCEDURE — 99214 OFFICE O/P EST MOD 30 MIN: CPT | Performed by: INTERNAL MEDICINE

## 2020-07-30 PROCEDURE — 85025 COMPLETE CBC W/AUTO DIFF WBC: CPT

## 2020-07-30 NOTE — PROGRESS NOTES
Subjective     CHIEF COMPLAINT:      Chief Complaint   Patient presents with   • Annual Exam     general questions       HISTORY OF PRESENT ILLNESS:     Kylie Bowie is a 55 y.o. female patient who returns today for follow up on her factor V Leiden mutation.  She is currently on aspirin 81 mg daily.  She is not having problems with bleeding but reports occasional bruising.    Patient reports exposure to poison ivy as she was trimming bushes.  She developed significant skin rash over her upper and lower extremities.  She was initially started on a Medrol Dosepak but symptoms did not resolve.  She was started on Kenalog ointment but she is running out of the medicine as she has to apply the ointment to large areas of her skin.  She was just prescribed prednisone taper that she is going to start.    Patient states that she is going to have a D&C by Dr. Kylie Irwin at [place].        REVIEW OF SYSTEMS:  Review of Systems   Constitutional: Negative for fatigue, fever and unexpected weight change.   HENT: Negative for nosebleeds and voice change.    Eyes: Negative for visual disturbance.   Respiratory: Negative for cough and shortness of breath.    Cardiovascular: Negative for chest pain and leg swelling.   Gastrointestinal: Negative for abdominal pain, blood in stool, constipation, diarrhea, nausea and vomiting.   Genitourinary: Negative for frequency and hematuria.   Musculoskeletal: Negative for back pain and joint swelling.   Skin: Negative for rash.   Neurological: Negative for dizziness and headaches.   Hematological: Negative for adenopathy. Bruises/bleeds easily.   Psychiatric/Behavioral: Negative for dysphoric mood. The patient is not nervous/anxious.      I reviewed and verified the CC and ROS obtained by the MA.     Past Medical History:   Diagnosis Date   • Arthritis    • Asthma    • Beta thalassemia trait    • Factor V Leiden mutation (CMS/HCC)     HETEROZYGOUS   • GERD (gastroesophageal reflux disease)    •  H/O Humeral head fracture    • H/O Left rotator cuff tear    • History of DVT of lower extremity    • History of iron deficiency anemia    • History of transfusion    • Hypertension    • Leiomyoma    • PONV (postoperative nausea and vomiting)    • Thrombophlebitis     Septic       Past Surgical History:   Procedure Laterality Date   • ANKLE SURGERY Left    •  SECTION  2003   • COLONOSCOPY N/A 2017    Two 2-3mm polypsin the sigmoid and in the transverse colon, NBIH.  PATH: Hyperplastic polyps   • DIAGNOSTIC LAPAROSCOPY EXPLORATORY LAPAROTOMY  2000   • ENDOSCOPY N/A 2017    LA Grade B reflux esophagitis, small HH, erythematous mucosa in the antrum.  PATH: Focal minimal chronic inflammation   • FOOT SURGERY Left     Kidner procedure with excision of accessory bone   • HYSTEROTOMY  1999   • MYOMECTOMY         Cancer-related family history includes Cancer in her cousin, cousin, maternal aunt, and maternal grandmother.  Social History     Tobacco Use   • Smoking status: Never Smoker   • Smokeless tobacco: Never Used   Substance Use Topics   • Alcohol use: Yes     Comment: social       MEDICATIONS:    Current Outpatient Medications:   •  albuterol (PROVENTIL HFA;VENTOLIN HFA) 108 (90 BASE) MCG/ACT inhaler, Inhale 2 puffs every 4 (four) hours as needed for wheezing., Disp: , Rfl:   •  atorvastatin (LIPITOR) 10 MG tablet, Take 20 mg by mouth Daily., Disp: , Rfl:   •  chlorthalidone (HYGROTON) 25 MG tablet, , Disp: , Rfl:   •  Cyclobenzaprine HCl (FLEXERIL PO), Take  by mouth As Needed., Disp: , Rfl:   •  diclofenac (VOLTAREN) 1 % gel gel, APPLY 4 GRAMS TO THE AFFECTED AREA FOUR TIMES DAILY, Disp: , Rfl: 2  •  ferrous sulfate 325 (65 FE) MG tablet, Take 1 tablet by mouth Daily With Breakfast., Disp: 30 tablet, Rfl: 11  •  fluticasone (FLONASE) 50 MCG/ACT nasal spray, 2 sprays into the nostril(s) as directed by provider Daily. Administer 2 sprays in each nostril for each  "dose., Disp: 3 bottle, Rfl: 1  •  lisinopril (PRINIVIL,ZESTRIL) 20 MG tablet, Take 20 mg by mouth daily., Disp: , Rfl:   •  meloxicam (MOBIC) 15 MG tablet, Take 15 mg by mouth., Disp: , Rfl:   •  montelukast (SINGULAIR) 10 MG tablet, Take 1 tablet by mouth Every Night., Disp: 90 tablet, Rfl: 1  •  omeprazole (priLOSEC) 40 MG capsule, TAKE ONE CAPSULE BY MOUTH DAILY, Disp: 30 capsule, Rfl: 0  •  rivaroxaban (XARELTO) 10 MG tablet, Take 1 tablet by mouth Daily. Start 24 hours after surgery and continue for 6 weeks as instructed., Disp: 42 tablet, Rfl: 0  •  triamcinolone (KENALOG) 0.1 % ointment, Apply  topically to the appropriate area as directed 2 (Two) Times a Day., Disp: 30 g, Rfl: 1    ALLERGIES:  Allergies   Allergen Reactions   • Keflex [Cephalexin]    • Latex    • Penicillins Unknown - Low Severity   • Amoxicillin-Pot Clavulanate Diarrhea         Objective   VITAL SIGNS:     Vitals:    07/30/20 1256   BP: 132/91   Pulse: 85   Resp: 16   Temp: 98.2 °F (36.8 °C)   TempSrc: Oral   SpO2: 96%   Weight: 108 kg (238 lb 4.8 oz)   Height: 170.2 cm (67.01\")   PainSc:   5     Body mass index is 37.31 kg/m².     Wt Readings from Last 3 Encounters:   07/30/20 108 kg (238 lb 4.8 oz)   02/14/20 107 kg (234 lb 12.8 oz)   09/26/19 103 kg (226 lb 9.6 oz)       PHYSICAL EXAMINATION:  GENERAL:  The patient appears in good general condition, not in acute distress.  SKIN: Macular rash over the upper and lower extremities. No ecchymosis.  HEAD:  Normocephalic.  EYES:  No Jaundice. No Pallor. Pupils equal. EOMI.  NECK:  Supple with Good ROM. No Thyromegaly. No Masses.  CHEST: Normal respiratory effort.   CARDIAC:  No edema.  EXTREMITIES: No varicose veins.  No changes of phlebitis.  No Calf tenderness.  NEUROLOGICAL:  No Focal neurological deficits.       DIAGNOSTIC DATA:     Results from last 7 days   Lab Units 07/30/20  1236   WBC 10*3/mm3 7.33   NEUTROS ABS 10*3/mm3 4.05   HEMOGLOBIN g/dL 13.2   HEMATOCRIT % 39.7   PLATELETS " 10*3/mm3 257     Results from last 7 days   Lab Units 07/30/20  1236   FERRITIN ng/mL 106.80   IRON mcg/dL 71   TIBC mcg/dL 398       Assessment/Plan   1.  Factor V Leiden heterozygous mutation.  · She has a history of left lower extremity DVT that developed after a myomectomy, D and C in 1999.   · She had a foot surgery and was placed on Xarelto 10 mg daily after the surgery.   · She is currently on aspirin 81 mg a day.  Based on her history of not having blood clots other than in the postoperative period, I recommended stopping aspirin prophylaxis.  · Prophylactic anticoagulation after surgery is is recommended.      2.  Anemia secondary to iron deficiency in a patient with Beta thalassemia trait.   · She was placed on ferrous sulfate 325 mg a day on 4/25/2019.  She took the ferrous sulfate for 6 months.    · Hemoglobin is normal today at 13.2.  · Iron studies from today are normal.  · Patient does not need to start back on oral iron.    3.  Patient is going to have a D&C and hysteroscopy.  She may require laparotomy.  Due to her factor V Leiden with prior postoperative DVT, I recommended prophylactic anticoagulation.    4.  Severe episode of poison ivy.  Patient has rash over the upper and lower extremities.  It did not resolve with a Medrol Dosepak.  She is using triamcinolone ointment but has to apply a large quantity of the medicine and is running out.    PLAN:    1.  Patient is cleared from a hematologic standpoint to have the D&C, hysteroscopy with possible laparotomy.    2.  I would recommend prophylactic anticoagulation with Xarelto 10 mg daily to be started 24 hours after the procedure (to be started when cleared by the surgeon).    3.  If the patient does not require laparotomy, I asked her to take Xarelto for 4 weeks.  If she has a laparotomy, I recommended taking Xarelto for 6 weeks.    4. I sent a prescription for triamcinolone ointment 0.1% twice daily to her pharmacy.    5.  I will see her in  follow-up in 6 weeks with repeat CBC.        Merly Giang MD  07/30/20

## 2020-07-30 NOTE — TELEPHONE ENCOUNTER
I contacted the patient. See note below. Pt v/u.    ----- Message from Merly Giang MD sent at 7/30/2020  2:22 PM EDT -----  Please inform the patient that her iron levels are good and she does not need to start back on oral iron.    Thank you

## 2020-08-10 DIAGNOSIS — K21.9 GASTROESOPHAGEAL REFLUX DISEASE, ESOPHAGITIS PRESENCE NOT SPECIFIED: ICD-10-CM

## 2020-08-10 RX ORDER — OMEPRAZOLE 40 MG/1
CAPSULE, DELAYED RELEASE ORAL
Qty: 30 CAPSULE | Refills: 0 | Status: SHIPPED | OUTPATIENT
Start: 2020-08-10 | End: 2021-01-29 | Stop reason: SDUPTHER

## 2020-09-17 ENCOUNTER — TELEPHONE (OUTPATIENT)
Dept: ONCOLOGY | Facility: CLINIC | Age: 55
End: 2020-09-17

## 2020-09-17 NOTE — TELEPHONE ENCOUNTER
Patient was in [facility].  She was discharged on 8/21.  She was there because she had blood clots in her leg and lungs.    She needs to schedule a hospital follow up appointment with Dr. Padmaja velez  She states that she has had trouble getting through to the office to make an appointment.    [phone]

## 2020-09-18 ENCOUNTER — TELEPHONE (OUTPATIENT)
Dept: ONCOLOGY | Facility: CLINIC | Age: 55
End: 2020-09-18

## 2020-09-18 ENCOUNTER — APPOINTMENT (OUTPATIENT)
Dept: LAB | Facility: HOSPITAL | Age: 55
End: 2020-09-18

## 2020-09-18 NOTE — TELEPHONE ENCOUNTER
Caller: KEDAR PEARL    Relationship to patient: SELF    Best call back number: [phone]    PT STATED SHE CALLED YESTERDAY AAND COULDNT ACCESS CHART TO CHECK APPT, WAS TOLD SOMEONE WOULD REACH OUT TO HER ABOUT APPT. DID NOT RECEIVE CALL OR MISSED IT. WS ABLE TO ACESS CHART TODAY AND DISCOVERED SHE MISSED HER 8AM APPT. WANTS TO KNOW IF SHE CAN STILL BE SEEN OR RESCHEDULED. STATES SHE CAN BE CONTACT THROUGH TEXT BETTER THAN CALL. PLESE REACH OUT ASAP. THANK YOU

## 2020-09-22 ENCOUNTER — DOCUMENTATION (OUTPATIENT)
Dept: ONCOLOGY | Facility: CLINIC | Age: 55
End: 2020-09-22

## 2020-09-22 ENCOUNTER — DOCUMENTATION (OUTPATIENT)
Dept: PHARMACY | Facility: HOSPITAL | Age: 55
End: 2020-09-22

## 2020-09-22 ENCOUNTER — LAB (OUTPATIENT)
Dept: LAB | Facility: HOSPITAL | Age: 55
End: 2020-09-22

## 2020-09-22 ENCOUNTER — OFFICE VISIT (OUTPATIENT)
Dept: ONCOLOGY | Facility: CLINIC | Age: 55
End: 2020-09-22

## 2020-09-22 VITALS
DIASTOLIC BLOOD PRESSURE: 84 MMHG | HEART RATE: 115 BPM | OXYGEN SATURATION: 97 % | TEMPERATURE: 97.4 F | HEIGHT: 67 IN | SYSTOLIC BLOOD PRESSURE: 138 MMHG | BODY MASS INDEX: 37.25 KG/M2 | WEIGHT: 237.3 LBS | RESPIRATION RATE: 16 BRPM

## 2020-09-22 DIAGNOSIS — D50.0 IRON DEFICIENCY ANEMIA DUE TO CHRONIC BLOOD LOSS: ICD-10-CM

## 2020-09-22 DIAGNOSIS — D25.9 UTERINE LEIOMYOMA, UNSPECIFIED LOCATION: ICD-10-CM

## 2020-09-22 DIAGNOSIS — D50.0 IRON DEFICIENCY ANEMIA DUE TO CHRONIC BLOOD LOSS: Primary | ICD-10-CM

## 2020-09-22 DIAGNOSIS — N93.9 VAGINAL BLEEDING: ICD-10-CM

## 2020-09-22 DIAGNOSIS — I82.412 ACUTE DEEP VEIN THROMBOSIS (DVT) OF FEMORAL VEIN OF LEFT LOWER EXTREMITY (HCC): ICD-10-CM

## 2020-09-22 DIAGNOSIS — I26.99 BILATERAL PULMONARY EMBOLISM (HCC): ICD-10-CM

## 2020-09-22 DIAGNOSIS — D68.51 FACTOR V LEIDEN MUTATION (HCC): Primary | ICD-10-CM

## 2020-09-22 LAB
BASOPHILS # BLD AUTO: 0.04 10*3/MM3 (ref 0–0.2)
BASOPHILS NFR BLD AUTO: 0.7 % (ref 0–1.5)
DEPRECATED RDW RBC AUTO: 40.4 FL (ref 37–54)
EOSINOPHIL # BLD AUTO: 0.07 10*3/MM3 (ref 0–0.4)
EOSINOPHIL NFR BLD AUTO: 1.1 % (ref 0.3–6.2)
ERYTHROCYTE [DISTWIDTH] IN BLOOD BY AUTOMATED COUNT: 13.6 % (ref 12.3–15.4)
HCT VFR BLD AUTO: 35.2 % (ref 34–46.6)
HGB BLD-MCNC: 11.7 G/DL (ref 12–15.9)
IMM GRANULOCYTES # BLD AUTO: 0.04 10*3/MM3 (ref 0–0.05)
IMM GRANULOCYTES NFR BLD AUTO: 0.7 % (ref 0–0.5)
LYMPHOCYTES # BLD AUTO: 2.02 10*3/MM3 (ref 0.7–3.1)
LYMPHOCYTES NFR BLD AUTO: 33.2 % (ref 19.6–45.3)
MCH RBC QN AUTO: 27.1 PG (ref 26.6–33)
MCHC RBC AUTO-ENTMCNC: 33.2 G/DL (ref 31.5–35.7)
MCV RBC AUTO: 81.7 FL (ref 79–97)
MONOCYTES # BLD AUTO: 0.49 10*3/MM3 (ref 0.1–0.9)
MONOCYTES NFR BLD AUTO: 8 % (ref 5–12)
NEUTROPHILS NFR BLD AUTO: 3.43 10*3/MM3 (ref 1.7–7)
NEUTROPHILS NFR BLD AUTO: 56.3 % (ref 42.7–76)
NRBC BLD AUTO-RTO: 0 /100 WBC (ref 0–0.2)
PLATELET # BLD AUTO: 251 10*3/MM3 (ref 140–450)
PMV BLD AUTO: 10.5 FL (ref 6–12)
RBC # BLD AUTO: 4.31 10*6/MM3 (ref 3.77–5.28)
WBC # BLD AUTO: 6.09 10*3/MM3 (ref 3.4–10.8)

## 2020-09-22 PROCEDURE — 99215 OFFICE O/P EST HI 40 MIN: CPT | Performed by: INTERNAL MEDICINE

## 2020-09-22 PROCEDURE — 85025 COMPLETE CBC W/AUTO DIFF WBC: CPT

## 2020-09-22 PROCEDURE — 36415 COLL VENOUS BLD VENIPUNCTURE: CPT

## 2020-09-22 RX ORDER — FERROUS SULFATE 325(65) MG
325 TABLET ORAL
Qty: 30 TABLET | Refills: 11 | Status: SHIPPED | OUTPATIENT
Start: 2020-09-22 | End: 2021-08-24

## 2020-09-22 NOTE — PROGRESS NOTES
Call rec from Kristal SMITH MA-Dr Giang saw pt today and wants to give her Xarelto 20 mg samples.    I have notified Kristal that I will contact pharmacy to get these ready and bring them to her. I informed her that Dr Giang will have to be the one to hand samples to the pt.     I have notified Janis Gandhi, and Cristina in pharmacy to gather 4 bottles of the Xarelto 20 mg samples and take to Kristal.

## 2020-09-22 NOTE — PROGRESS NOTES
Subjective     CHIEF COMPLAINT:      Chief Complaint   Patient presents with   • Follow-up     hospital return       HISTORY OF PRESENT ILLNESS:     Kylie Bowie is a 55 y.o. female patient who returns today for follow up on her factor V Leiden mutation. She was seen at our office on 7/30/2020.  She reported at that time skin rash that was attributed to poison ivy. She was treated with a medrol dose pack. She reported at that time that she was going to have surgery for large uterine fibroids.  We made recommendations for her to take Xarelto postoperatively for DVT prophylaxis. The patient did not report pain in the legs on that day. However, she reports today that she had pain prior to and after that visit. On the day of surgery on 8/19/2020, she reported shortness of breath. She initially thought it was her asthma. CT angiogram was obtained and revealed acute pulmonary embolism.  The surgery was canceled.  She was admitted to the hospital and started on treatment for the PE.  She was started on Lovenox.  Venous doppler revealed acute DVT in the left lower extremities.     Patient was discharged on Xarelto 15 mg twice daily.  However, she had significant vaginal bleeding.  She had follow-up with gynecology and was referred to gynecology oncology they recommended surgery and recommended having IVC filter placement.    Patient was supposed to have IVC filter placed tomorrow.  She stopped anticoagulation yesterday in preparation for this.    REVIEW OF SYSTEMS:  Review of Systems   Constitutional: Negative for fatigue, fever and unexpected weight change.   HENT: Negative for nosebleeds and voice change.    Eyes: Negative for visual disturbance.   Respiratory: Positive for shortness of breath. Negative for cough.    Cardiovascular: Positive for leg swelling. Negative for chest pain.   Gastrointestinal: Positive for abdominal pain and constipation. Negative for blood in stool, diarrhea, nausea and vomiting.    Genitourinary: Negative for frequency and hematuria.   Musculoskeletal: Positive for back pain and joint swelling.   Skin: Negative for rash.   Neurological: Negative for dizziness and headaches.   Hematological: Negative for adenopathy. Bruises/bleeds easily.   Psychiatric/Behavioral: Negative for dysphoric mood. The patient is not nervous/anxious.      I reviewed and verified the CC and ROS obtained by the MA.     Past Medical History:   Diagnosis Date   • Arthritis    • Asthma    • Beta thalassemia trait    • Factor V Leiden mutation (CMS/HCC)     HETEROZYGOUS   • GERD (gastroesophageal reflux disease)    • H/O Humeral head fracture    • H/O Left rotator cuff tear    • History of DVT of lower extremity    • History of iron deficiency anemia    • History of transfusion    • Hypertension    • Leiomyoma    • PONV (postoperative nausea and vomiting)    • Thrombophlebitis     Septic       Past Surgical History:   Procedure Laterality Date   • ANKLE SURGERY Left    •  SECTION  2003   • COLONOSCOPY N/A 2017    Two 2-3mm polypsin the sigmoid and in the transverse colon, NBIH.  PATH: Hyperplastic polyps   • DIAGNOSTIC LAPAROSCOPY EXPLORATORY LAPAROTOMY  2000   • ENDOSCOPY N/A 2017    LA Grade B reflux esophagitis, small HH, erythematous mucosa in the antrum.  PATH: Focal minimal chronic inflammation   • FOOT SURGERY Left 2015    Kidner procedure with excision of accessory bone   • HYSTEROTOMY  1999   • MYOMECTOMY         Cancer-related family history includes Cancer in her cousin, cousin, maternal aunt, and maternal grandmother.  Social History     Tobacco Use   • Smoking status: Never Smoker   • Smokeless tobacco: Never Used   Substance Use Topics   • Alcohol use: Yes     Comment: social       MEDICATIONS:    Current Outpatient Medications:   •  albuterol (PROVENTIL HFA;VENTOLIN HFA) 108 (90 BASE) MCG/ACT inhaler, Inhale 2 puffs every 4 (four) hours as needed for  "wheezing., Disp: , Rfl:   •  atorvastatin (LIPITOR) 10 MG tablet, Take 20 mg by mouth Daily., Disp: , Rfl:   •  chlorthalidone (HYGROTON) 25 MG tablet, , Disp: , Rfl:   •  Cyclobenzaprine HCl (FLEXERIL PO), Take  by mouth As Needed., Disp: , Rfl:   •  diclofenac (VOLTAREN) 1 % gel gel, APPLY 4 GRAMS TO THE AFFECTED AREA FOUR TIMES DAILY, Disp: , Rfl: 2  •  fluticasone (FLONASE) 50 MCG/ACT nasal spray, 2 sprays into the nostril(s) as directed by provider Daily. Administer 2 sprays in each nostril for each dose., Disp: 3 bottle, Rfl: 1  •  lisinopril (PRINIVIL,ZESTRIL) 20 MG tablet, Take 20 mg by mouth daily., Disp: , Rfl:   •  montelukast (SINGULAIR) 10 MG tablet, Take 1 tablet by mouth Every Night., Disp: 90 tablet, Rfl: 1  •  omeprazole (priLOSEC) 40 MG capsule, TAKE ONE CAPSULE BY MOUTH DAILY, Disp: 30 capsule, Rfl: 0  •  rivaroxaban (XARELTO) 20 MG tablet, Take 20 mg by mouth Daily., Disp: , Rfl:   •  ferrous sulfate 325 (65 FE) MG tablet, Take 1 tablet by mouth Daily With Breakfast., Disp: 30 tablet, Rfl: 11  •  rivaroxaban (XARELTO) 10 MG tablet, Take 1 tablet by mouth Daily. Start 24 hours after surgery and continue for 6 weeks as instructed., Disp: 42 tablet, Rfl: 0  •  triamcinolone (KENALOG) 0.1 % ointment, Apply  topically to the appropriate area as directed 2 (Two) Times a Day., Disp: 30 g, Rfl: 1    ALLERGIES:  Allergies   Allergen Reactions   • Keflex [Cephalexin]    • Latex    • Penicillins Unknown - Low Severity   • Amoxicillin-Pot Clavulanate Diarrhea         Objective   VITAL SIGNS:     Vitals:    09/22/20 1249   BP: 138/84   Pulse: 115   Resp: 16   Temp: 97.4 °F (36.3 °C)   TempSrc: Oral   SpO2: 97%   Weight: 108 kg (237 lb 4.8 oz)   Height: 170.2 cm (67.01\")   PainSc: 0-No pain     Body mass index is 37.16 kg/m².     Wt Readings from Last 3 Encounters:   09/22/20 108 kg (237 lb 4.8 oz)   07/30/20 108 kg (238 lb 4.8 oz)   02/14/20 107 kg (234 lb 12.8 oz)       PHYSICAL EXAMINATION:  GENERAL:  The " patient appears in good general condition, not in acute distress.  SKIN: hyperpigmentation at the site of the previous skin rash. No ecchymosis.  HEAD:  Normocephalic.  EYES:  No Jaundice. No Pallor. Pupils equal. EOMI.  NECK:  Supple with Good ROM. No Thyromegaly. No Masses.  LYMPHATICS:  No cervical or supraclavicular lymphadenopathy.  CHEST: Normal respiratory effort. Lungs clear to auscultation.   CARDIAC:  Normal S1 & S2. No murmur. No edema.  ABDOMEN:  Lower abdominal tenderness. Uterus palpable. No Hepatomegaly. No Splenomegaly.  EXTREMITIES:  Bilateral calf tenderness. No erythema or warmth.   NEUROLOGICAL:  No Focal neurological deficits.       DIAGNOSTIC DATA:     Results from last 7 days   Lab Units 09/22/20  1230   WBC 10*3/mm3 6.09   NEUTROS ABS 10*3/mm3 3.43   HEMOGLOBIN g/dL 11.7*   HEMATOCRIT % 35.2   PLATELETS 10*3/mm3 251     EXAMINATION: CT angiography of the chest with contrast.    DATE: 08/19/2020    HISTORY: Postoperative, shortness of breath.    TECHNIQUE:   Helical CT angiography of the chest was performed following the administration of contrast in order to evaluate the pulmonary arteries for pulmonary embolus.  A 100 mL Omnipaque was administered without immediate complication.  This was   followed by imaging post-processing with three-dimensional reconstruction of the pulmonary arteries in the coronal and sagittal planes with images stored in the patient's permanent medical record.    CT scans at this facility use dose modulation, iterative reconstruction and/or weight based dosing when appropriate to reduce radiation dose to as low as reasonably achievable.    COMPARISON: No prior.    FINDINGS: There is a diagnostic contrast bolus. The main pulmonary arteries nondilated. There are bilateral pulmonary arterial emboli within the distal main pulmonary arteries and involving the proximal segmental branches of the lungs bilaterally. The   heart size is normal. The right heart is normal. There  is no pericardial effusion.    Normal enhancement thoracic aorta and proximal great vessels.    Lung windows demonstrate no consolidation, effusion or pneumothorax. No evidence of groundglass opacity or pulmonary infarction.    No osseous pathologic lesion. There is chronic degenerative change of the right shoulder.    IMPRESSION:   1. Bilateral distal right and left pulmonary arterial emboli and segmental pulmonary arterial emboli.  2. No CT evidence for right heart strain.  3. No evidence for pneumonia or edema.      PROCEDURE PERFORMED: DUPLEX US VENOUS EXT LOW BILATERAL 8/20/2020:    Conclusions: Right lower extremity with no evidence of deep or superficial thrombus.  Left lower extremity with evidence of acute, occlusive deep vein thrombus in the prox-distal femoral vein, popliteal vein, posterior tibial veins, peroneal veins, gastroc vein and soleal veins. There is also evidence for acute, occlusive superficial vein   thrombus in the small saphenous vein.    Compared to previous examination on 09/02/14, there is new evidence of extensive deep vein thrombus in the left leg.      PROCEDURE PERFORMED: DUPLEX US VENOUS EXT LOW BILATERAL 9/9/2020:    Conclusions: Right lower extremity with no evidence of deep or superficial thrombus  Left  lower extremity with evidence of chronic  non-occlusive  deep vein thrombus in the  distal femoral, popliteal, posterior tibial, peroneal and gastrocnemius veins.  There is evidence for  chronic superficial vein thrombus in the small saphenous   vein.  Compared to previous examination 8/19/20 , there has been no significant change.      THIS DOCUMENT HAS BEEN ELECTRONICALLY SIGNED BY: Sebastian Wilkerson MD      DATE: September 9, 2020    EXAMINATION: Perfusion scintigraphy study.    COMPARISON: Chest radiograph performed today.    RADIOPHARMACEUTICAL: 3.9 mCi Tc-99m MAA IV.    HISTORY:     Shortness of breath. Evaluate for pulmonary embolism.    FINDINGS:  The comparison chest  radiograph demonstrates no pulmonary infiltrates or pleural effusions. The perfusion images show no defects of the MAA distribution.    IMPRESSION:   1.Normal perfusion study. No evidence of pulmonary embolism.      Assessment/Plan   1.  Factor V Leiden heterozygous mutation.  · She has a history of left lower extremity DVT that developed after a myomectomy, D and C in 1999.   · She had a foot surgery and was placed on Xarelto 10 mg daily after the surgery.   · Patient was diagnosed with acute PE and left lower extremity DVT on 8/19/2020 as below.  · Based on recurrent thrombosis and the development of acute DVT and PE in August 2020 that were unprovoked, I recommended lifelong anticoagulation.    2.  Acute bilateral pulmonary embolism diagnosed on 8/19/2020.  There was no evidence of right heart strain.  She was found to have acute left lower extremity deep vein thrombosis in the left femoral, popliteal, posterior tibial, peroneal veins and gastrocnemius/soleal vein.    DVT and PE were unprovoked.  She was hospitalized at [facility] on 8/19/2020 and anticoagulated with Lovenox.  VQ scan on 9/9/2020 showed no perfusion defects.  Venous Doppler on 9/9/2020 showed the thrombosis to have become chronic according to the report.    Patient is tachycardic today.  She continues to feel short of breath.  Patient is 97% today.  Heart rate is 115.    3.  Vaginal bleeding secondary to uterine fibroids.  Patient reported to have enlargement of the uterus to a 20 week gestation size.  She was referred to gynecology oncology and surgery was recommended to be done in the near future.  They recommended having an IVC filter placed in preparation for the surgery.    I had a lengthy discussion with the patient.  Since the surgery is not considered emergent, the current recommendations are to wait 6 months from the time of diagnosis of acute pulmonary embolism before non-emergent surgeries are done.  I did not recommend having IVC  filter placed due to the potential risks associated with it.    Patient states that she received Lupron in the past and benefited from the injections.  I recommended treatment with Lupron to decrease her bleeding while awaiting for the 6 months of full dose anticoagulation to take effect.  Patient agreed with the recommendation.    4.  Anemia secondary to iron deficiency in a patient with Beta thalassemia trait.   · She was placed on ferrous sulfate 325 mg a day on 4/25/2019.  She took the ferrous sulfate for 6 months.    · Hemoglobin was normal at 13.2 on 7/30/2020.    · Hemoglobin dropped to 11.7 today as a result of the recent vaginal bleeding.         PLAN:    1.  I recommended delaying the surgery by 5 months (February 2021).    2..  Continue anticoagulation with Xarelto 20 mg daily.  We provided the patient with samples of the medicine today.    3.  I did not recommend having an IVC filter placed.  She will contact the hospital and cancel the procedure for tomorrow.    4.  Because the patient is having problem with bleeding from her large uterine fibroids, I recommended treatment with Lupron for now.  Based on the mechanism of action, Lupron would not be expected to increase the patient's risk for thrombosis.    5.  After the patient completes the therapeutic course of anticoagulation with Xarelto, I recommended changing to prophylactic dose of 10 mg daily and continuing this indefinitely.    6.  Start back on ferrous sulfate 325 mg once daily.    7.  Follow-up in 1 month with repeat CBC ferritin iron panel.        Merly Giang MD  09/22/20

## 2020-09-22 NOTE — PROGRESS NOTES
Site of Appt/Pickup: (n) [place]; (y) [place]; (n) [place];    Prescriber (river) contacted pharmacy to obtain the medication below.    NDC:  06406-963-39  Drug name: xarelto  Strength: 20mg  Total Quantity:  28 (Containers- 4 X Units per Containers- 7)  Lot#:  [id number]  Expiration: 5/22    Prescriber or staff member who picked up medication Kristal

## 2020-09-28 ENCOUNTER — APPOINTMENT (OUTPATIENT)
Dept: LAB | Facility: HOSPITAL | Age: 55
End: 2020-09-28

## 2020-10-07 ENCOUNTER — TELEPHONE (OUTPATIENT)
Dept: ONCOLOGY | Facility: HOSPITAL | Age: 55
End: 2020-10-07

## 2020-10-07 DIAGNOSIS — N93.9 VAGINAL BLEEDING: Primary | ICD-10-CM

## 2020-10-29 ENCOUNTER — TELEPHONE (OUTPATIENT)
Dept: OBSTETRICS AND GYNECOLOGY | Age: 55
End: 2020-10-29

## 2020-10-29 ENCOUNTER — LAB (OUTPATIENT)
Dept: LAB | Facility: HOSPITAL | Age: 55
End: 2020-10-29

## 2020-10-29 ENCOUNTER — OFFICE VISIT (OUTPATIENT)
Dept: ONCOLOGY | Facility: CLINIC | Age: 55
End: 2020-10-29

## 2020-10-29 VITALS
HEIGHT: 67 IN | RESPIRATION RATE: 18 BRPM | DIASTOLIC BLOOD PRESSURE: 93 MMHG | OXYGEN SATURATION: 98 % | BODY MASS INDEX: 37.83 KG/M2 | WEIGHT: 241 LBS | TEMPERATURE: 97.1 F | SYSTOLIC BLOOD PRESSURE: 135 MMHG | HEART RATE: 85 BPM

## 2020-10-29 DIAGNOSIS — I82.412 ACUTE DEEP VEIN THROMBOSIS (DVT) OF FEMORAL VEIN OF LEFT LOWER EXTREMITY (HCC): ICD-10-CM

## 2020-10-29 DIAGNOSIS — D68.51 FACTOR V LEIDEN MUTATION (HCC): ICD-10-CM

## 2020-10-29 DIAGNOSIS — D50.0 IRON DEFICIENCY ANEMIA DUE TO CHRONIC BLOOD LOSS: ICD-10-CM

## 2020-10-29 DIAGNOSIS — D25.9 UTERINE LEIOMYOMA, UNSPECIFIED LOCATION: ICD-10-CM

## 2020-10-29 DIAGNOSIS — I26.99 BILATERAL PULMONARY EMBOLISM (HCC): ICD-10-CM

## 2020-10-29 DIAGNOSIS — M54.42 ACUTE LEFT-SIDED LOW BACK PAIN WITH LEFT-SIDED SCIATICA: ICD-10-CM

## 2020-10-29 DIAGNOSIS — I26.99 BILATERAL PULMONARY EMBOLISM (HCC): Primary | ICD-10-CM

## 2020-10-29 LAB
BASOPHILS # BLD AUTO: 0.03 10*3/MM3 (ref 0–0.2)
BASOPHILS NFR BLD AUTO: 0.5 % (ref 0–1.5)
DEPRECATED RDW RBC AUTO: 42.2 FL (ref 37–54)
EOSINOPHIL # BLD AUTO: 0.09 10*3/MM3 (ref 0–0.4)
EOSINOPHIL NFR BLD AUTO: 1.5 % (ref 0.3–6.2)
ERYTHROCYTE [DISTWIDTH] IN BLOOD BY AUTOMATED COUNT: 13.4 % (ref 12.3–15.4)
FERRITIN SERPL-MCNC: 61.4 NG/ML (ref 11–207)
HCT VFR BLD AUTO: 37 % (ref 34–46.6)
HGB BLD-MCNC: 11.7 G/DL (ref 12–15.9)
IMM GRANULOCYTES # BLD AUTO: 0.01 10*3/MM3 (ref 0–0.05)
IMM GRANULOCYTES NFR BLD AUTO: 0.2 % (ref 0–0.5)
IRON 24H UR-MRATE: 49 MCG/DL (ref 37–145)
IRON SATN MFR SERPL: 12 % (ref 14–48)
LYMPHOCYTES # BLD AUTO: 1.88 10*3/MM3 (ref 0.7–3.1)
LYMPHOCYTES NFR BLD AUTO: 32.1 % (ref 19.6–45.3)
MCH RBC QN AUTO: 27 PG (ref 26.6–33)
MCHC RBC AUTO-ENTMCNC: 31.6 G/DL (ref 31.5–35.7)
MCV RBC AUTO: 85.5 FL (ref 79–97)
MONOCYTES # BLD AUTO: 0.64 10*3/MM3 (ref 0.1–0.9)
MONOCYTES NFR BLD AUTO: 10.9 % (ref 5–12)
NEUTROPHILS NFR BLD AUTO: 3.21 10*3/MM3 (ref 1.7–7)
NEUTROPHILS NFR BLD AUTO: 54.8 % (ref 42.7–76)
NRBC BLD AUTO-RTO: 0 /100 WBC (ref 0–0.2)
PLATELET # BLD AUTO: 244 10*3/MM3 (ref 140–450)
PMV BLD AUTO: 10.4 FL (ref 6–12)
RBC # BLD AUTO: 4.33 10*6/MM3 (ref 3.77–5.28)
TIBC SERPL-MCNC: 405 MCG/DL (ref 249–505)
TRANSFERRIN SERPL-MCNC: 289 MG/DL (ref 200–360)
WBC # BLD AUTO: 5.86 10*3/MM3 (ref 3.4–10.8)

## 2020-10-29 PROCEDURE — 84466 ASSAY OF TRANSFERRIN: CPT

## 2020-10-29 PROCEDURE — 85025 COMPLETE CBC W/AUTO DIFF WBC: CPT

## 2020-10-29 PROCEDURE — 99214 OFFICE O/P EST MOD 30 MIN: CPT | Performed by: INTERNAL MEDICINE

## 2020-10-29 PROCEDURE — 82728 ASSAY OF FERRITIN: CPT

## 2020-10-29 PROCEDURE — 83540 ASSAY OF IRON: CPT

## 2020-10-29 PROCEDURE — 36415 COLL VENOUS BLD VENIPUNCTURE: CPT

## 2020-10-29 NOTE — PROGRESS NOTES
Subjective     CHIEF COMPLAINT:      Chief Complaint   Patient presents with   • Follow-up     no concerns   • Med Refill     Xarelto       HISTORY OF PRESENT ILLNESS:     Kylie Bowie is a 55 y.o. female patient who returns today for follow up on her DVT and pulmonary embolism.  She is on Xarelto 20 mg daily. She is tolerating it without problem with bleeding. Since the severe vaginal bleeding she had soon after starting anticoagulation, she did not have recurrence of the vaginal bleeding.      Patient is no longer having shortness of breath.  She denies chest pain.     Patient reports developing pain in the lower back on the left. It radiates to her left thigh and leg and down to her foot. She is having difficulty with bending over due to the pain.     REVIEW OF SYSTEMS:  Review of Systems   Constitutional: Negative for fatigue, fever and unexpected weight change.   HENT: Negative for nosebleeds and voice change.    Eyes: Negative for visual disturbance.   Respiratory: Negative for cough and shortness of breath.    Cardiovascular: Positive for leg swelling. Negative for chest pain.   Gastrointestinal: Negative for abdominal pain, blood in stool, constipation, diarrhea, nausea and vomiting.   Genitourinary: Negative for frequency and hematuria.   Musculoskeletal: Positive for back pain and neck pain. Negative for joint swelling.   Skin: Negative for rash.   Neurological: Negative for dizziness and headaches.   Hematological: Negative for adenopathy. Does not bruise/bleed easily.   Psychiatric/Behavioral: Negative for dysphoric mood. The patient is not nervous/anxious.      I reviewed and verified the CC and ROS obtained by the MA.     Past Medical History:   Diagnosis Date   • Arthritis    • Asthma    • Beta thalassemia trait    • Factor V Leiden mutation (CMS/HCC)     HETEROZYGOUS   • GERD (gastroesophageal reflux disease)    • H/O Humeral head fracture 2016   • H/O Left rotator cuff tear    • History of DVT of  lower extremity    • History of iron deficiency anemia    • History of transfusion    • Hypertension    • Leiomyoma    • PONV (postoperative nausea and vomiting)    • Thrombophlebitis     Septic       Past Surgical History:   Procedure Laterality Date   • ANKLE SURGERY Left    •  SECTION  2003   • COLONOSCOPY N/A 2017    Two 2-3mm polypsin the sigmoid and in the transverse colon, NBIH.  PATH: Hyperplastic polyps   • DIAGNOSTIC LAPAROSCOPY EXPLORATORY LAPAROTOMY  2000   • ENDOSCOPY N/A 2017    LA Grade B reflux esophagitis, small HH, erythematous mucosa in the antrum.  PATH: Focal minimal chronic inflammation   • FOOT SURGERY Left 2015    Kidner procedure with excision of accessory bone   • HYSTEROTOMY  1999   • MYOMECTOMY         Cancer-related family history includes Cancer in her cousin, cousin, maternal aunt, and maternal grandmother.  Social History     Tobacco Use   • Smoking status: Never Smoker   • Smokeless tobacco: Never Used   Substance Use Topics   • Alcohol use: Yes     Comment: social       MEDICATIONS:    Current Outpatient Medications:   •  albuterol (PROVENTIL HFA;VENTOLIN HFA) 108 (90 BASE) MCG/ACT inhaler, Inhale 2 puffs every 4 (four) hours as needed for wheezing., Disp: , Rfl:   •  atorvastatin (LIPITOR) 10 MG tablet, Take 20 mg by mouth Daily., Disp: , Rfl:   •  chlorthalidone (HYGROTON) 25 MG tablet, , Disp: , Rfl:   •  diclofenac (VOLTAREN) 1 % gel gel, APPLY 4 GRAMS TO THE AFFECTED AREA FOUR TIMES DAILY, Disp: , Rfl: 2  •  ferrous sulfate 325 (65 FE) MG tablet, Take 1 tablet by mouth Daily With Breakfast., Disp: 30 tablet, Rfl: 11  •  fluticasone (FLONASE) 50 MCG/ACT nasal spray, 2 sprays into the nostril(s) as directed by provider Daily. Administer 2 sprays in each nostril for each dose., Disp: 3 bottle, Rfl: 1  •  lisinopril (PRINIVIL,ZESTRIL) 20 MG tablet, Take 20 mg by mouth daily., Disp: , Rfl:   •  montelukast (SINGULAIR) 10 MG tablet, Take 1  "tablet by mouth Every Night., Disp: 90 tablet, Rfl: 1  •  omeprazole (priLOSEC) 40 MG capsule, TAKE ONE CAPSULE BY MOUTH DAILY, Disp: 30 capsule, Rfl: 0  •  rivaroxaban (XARELTO) 20 MG tablet, Take 20 mg by mouth Daily., Disp: , Rfl:   •  triamcinolone (KENALOG) 0.1 % ointment, Apply  topically to the appropriate area as directed 2 (Two) Times a Day., Disp: 30 g, Rfl: 1    ALLERGIES:  Allergies   Allergen Reactions   • Keflex [Cephalexin]    • Latex    • Penicillins Unknown - Low Severity   • Amoxicillin-Pot Clavulanate Diarrhea         Objective   VITAL SIGNS:     Vitals:    10/29/20 0831   BP: 135/93   Pulse: 85   Resp: 18   Temp: 97.1 °F (36.2 °C)   TempSrc: Temporal   SpO2: 98%   Weight: 109 kg (241 lb)   Height: 170.2 cm (67.01\")   PainSc:   8   PainLoc: Generalized     Body mass index is 37.74 kg/m².     Wt Readings from Last 3 Encounters:   10/29/20 109 kg (241 lb)   09/22/20 108 kg (237 lb 4.8 oz)   07/30/20 108 kg (238 lb 4.8 oz)       PHYSICAL EXAMINATION:  GENERAL:  The patient appears in good general condition, not in acute distress.  SKIN: No skin rashes. No ecchymosis.  HEAD:  Normocephalic.  EYES:  No Jaundice. No Pallor. Pupils equal. EOMI.  NECK:  Supple with Good ROM.   CHEST: Normal respiratory effort. Lungs clear to auscultation.   CARDIAC:  Normal S1 & S2. No murmur. No edema.  ABDOMEN:  Soft. No tenderness. No Hepatomegaly. No Splenomegaly. No masses.  EXTREMITIES:  No left Calf tenderness.  Limitation in left leg rise.  NEUROLOGICAL:  No Focal neurological deficits.       DIAGNOSTIC DATA:     Results from last 7 days   Lab Units 10/29/20  0817   WBC 10*3/mm3 5.86   NEUTROS ABS 10*3/mm3 3.21   HEMOGLOBIN g/dL 11.7*   HEMATOCRIT % 37.0   PLATELETS 10*3/mm3 244       Component      Latest Ref Rng & Units 7/30/2020 7/30/2020 10/29/2020 10/29/2020          12:36 PM 12:36 PM  8:17 AM  8:17 AM   Iron      37 - 145 mcg/dL  71  49   Iron Saturation      14 - 48 %  18  12 (L)   Transferrin      200 - " 360 mg/dL  284  289   TIBC      249 - 505 mcg/dL  398  405   Ferritin      11.00 - 207.00 ng/mL 106.80  61.40        Assessment/Plan   1.  Factor V Leiden heterozygous mutation.  · She has a history of left lower extremity DVT that developed after a myomectomy, D and C in 1999.   · She had a foot surgery and was placed on Xarelto 10 mg daily after the surgery.   · Patient was diagnosed with acute PE and left lower extremity DVT on 8/19/2020 as below.  · Based on recurrent thrombosis and the development of acute DVT and PE in August 2020 that were unprovoked, lifelong anticoagulation was recommended.  · Reports improvement in the pain in the left lower extremity.    2.  Acute bilateral pulmonary embolism diagnosed on 8/19/2020.    · There was no evidence of right heart strain.    · She was found to have acute left lower extremity deep vein thrombosis in the left femoral, popliteal, posterior tibial, peroneal veins and gastrocnemius/soleal vein.  · DVT and PE were unprovoked.    · She was hospitalized at [facility] on 8/19/2020 and anticoagulated with Lovenox.    · VQ scan on 9/9/2020 showed no perfusion defects.    · Venous Doppler on 9/9/2020 showed the thrombosis to have become chronic.  · On 9/22/2020, she was tachycardic with heart rate of 115.  O2 saturation was 97%.  · Reports improvement in the shortness of breath.  She is no longer tachycardic.  Oxygen saturation is currently normal.    3.  Vaginal bleeding secondary to uterine fibroids.    · Patient reported to have enlargement of the uterus to a 20 week gestation size.    · She was referred to gynecology oncology and surgery was recommended to be done ASAP.    · They recommended having an IVC filter placed in preparation for the surgery.  · Due to the timeframe from the diagnosis of a PE and since the patient was symptomatic in September 2020, the recommendation was to do radiosurgery ~6 months from the time of diagnosis of the PE (February 2021).  · I  recommended Lupron to decrease bleeding while on anticoagulation.  · Patient then responded to Lupron in the past.  · Patient's gynecologist did not recommend Lupron.  · Patient is asking for a referral to a different gynecologist.    4.  Anemia secondary to iron deficiency in a patient with Beta thalassemia trait.   · She was placed on ferrous sulfate 325 mg a day on 4/25/2019.  She took the ferrous sulfate for 6 months.    · Hemoglobin was normal at 13.2 on 7/30/2020.    · Hemoglobin dropped to 11.7 on 9/22/2020.  · Patient was started on ferrous sulfate 325 mg daily on 9/22/2020.  · Hemoglobin is stable at 11.7 today.  ·         PLAN:    1.  Continue take 20 mg daily.  2.  Continue ferrous sulfate 325 mg daily.  3.  Obtain MRI of the lumbosacral spine.  I will refer the patient to neurosurgery.  4.  I will obtain a follow-up CT angiogram of the chest in early February 2021.  I will also obtain bilateral lower extremity venous Doppler.  I will see her in follow-up 1 week after.          Merly Giang MD  10/29/20

## 2020-10-29 NOTE — TELEPHONE ENCOUNTER
Merly Giang MD oncologist  put an referral in for this patient to be seen, stated that she's having some vaginal bleeding, would  be able to see this patient sometime soon? Please advise

## 2020-10-29 NOTE — TELEPHONE ENCOUNTER
She has already no showed for our office. They can try another gyn office for a sooner appt. She has also been seen by gyn/oncology for uterine fibroids (Dr Lopes). Can she f/u there again?

## 2020-11-18 ENCOUNTER — HOSPITAL ENCOUNTER (OUTPATIENT)
Dept: MRI IMAGING | Facility: HOSPITAL | Age: 55
Discharge: HOME | End: 2020-11-18
Admitting: INTERNAL MEDICINE

## 2020-11-18 DIAGNOSIS — M54.42 ACUTE LEFT-SIDED LOW BACK PAIN WITH LEFT-SIDED SCIATICA: ICD-10-CM

## 2020-11-18 PROCEDURE — 0 GADOBENATE DIMEGLUMINE 529 MG/ML SOLUTION: Performed by: INTERNAL MEDICINE

## 2020-11-18 PROCEDURE — 72158 MRI LUMBAR SPINE W/O & W/DYE: CPT

## 2020-11-18 PROCEDURE — 82565 ASSAY OF CREATININE: CPT

## 2020-11-18 PROCEDURE — A9577 INJ MULTIHANCE: HCPCS | Performed by: INTERNAL MEDICINE

## 2020-11-18 RX ADMIN — GADOBENATE DIMEGLUMINE 20 ML: 529 INJECTION, SOLUTION INTRAVENOUS at 17:47

## 2020-11-20 LAB — CREAT BLDA-MCNC: 1.2 MG/DL (ref 0.6–1.3)

## 2020-12-08 NOTE — PROGRESS NOTES
Subjective   History of Present Illness: Kylie Bowie is a 55 y.o. female is being seen for consultation today at the request of Merly Giang MD for intermittent back pain that radiates into bilateral legs; left greater than right with numbness and tingling. She also complains of neck pain that radiates into bilateral arms; right greater than left. She denies any conservative treatment although she may have tried a chiropractor in the distant past and they scared her.  She states there is no specific injury or illness that led to the pattern of pain in the low back and the neck.  It appears that the low back symptoms are affecting her the most at this point.      While in the room and during my examination of the patient I wore a mask and eye protection.  I washed my hands before and after this patient encounter.  The patient was also wearing a mask.    Back Pain  The problem occurs intermittently. The pain is present in the lumbar spine. The quality of the pain is described as aching, burning, cramping, stabbing and shooting. The pain radiates to the right thigh, right knee, right foot, left thigh, left knee and left foot. The symptoms are aggravated by position. Associated symptoms include a fever, leg pain, numbness and tingling. Pertinent negatives include no bladder incontinence, bowel incontinence, chest pain or weakness.   Neck Pain   The problem occurs intermittently. The problem has been gradually worsening. The pain is present in the left side, midline and right side. The quality of the pain is described as aching, burning, cramping, shooting and stabbing. The pain is mild. The symptoms are aggravated by position. Associated symptoms include a fever, leg pain, numbness and tingling. Pertinent negatives include no chest pain, pain with swallowing, trouble swallowing or weakness.       The following portions of the patient's history were reviewed and updated as appropriate: allergies, current  "medications, past family history, past medical history, past social history, past surgical history and problem list.    Review of Systems   Constitutional: Positive for activity change and fever.   HENT: Negative.  Negative for trouble swallowing.    Eyes: Negative.  Negative for visual disturbance.   Respiratory: Negative.  Negative for chest tightness and shortness of breath.    Cardiovascular: Negative.  Negative for chest pain.   Gastrointestinal: Negative.  Negative for bowel incontinence and nausea.   Endocrine: Negative.  Negative for cold intolerance and heat intolerance.   Genitourinary: Negative.  Negative for bladder incontinence and difficulty urinating.   Musculoskeletal: Positive for back pain and neck pain.   Skin: Negative.  Negative for rash.   Allergic/Immunologic: Negative.  Negative for environmental allergies.   Neurological: Positive for tingling and numbness. Negative for weakness.   Hematological: Negative.  Does not bruise/bleed easily.   Psychiatric/Behavioral: Positive for sleep disturbance.       Objective     Vitals:    12/22/20 1032   BP: 138/73   Pulse: 76   Temp: 97.3 °F (36.3 °C)   Weight: 109 kg (241 lb)   Height: 170.2 cm (67.01\")     Body mass index is 37.73 kg/m².      Physical Exam  Neurologic Exam    Physical Exam:    CONSTITUTIONAL: This 55 year old right handed Black female appears well developed, obese and in no acute distress.    HEAD & FACE: the head and face are symmetric, normocephalic and atraumatic.    EYES: Inspection of the conjunctivae and lids reveals no swelling, erythema or discharge.  Pupils are round, equal and reactive to light and there is no scleral icterus.    EARS, NOSE, MOUTH & THROAT: On inspection, the ears and nose are within normal limits.    NECK: the neck is supple and symmetric. The trachea is midline with no masses.    PULMONARY: Respiratory effort is normal with no increased work of breathing or signs of respiratory distress.    CARDIOVASCULAR: " Pedal pulses are +2/4 bilaterally. Examination of the extremities shows no edema or varicosities.    LYMPHATIC: There is no palpable lymphadenopathy of the neck.    MUSCULOSKELETAL: Gait and station are within normal limits. The spine has normal alignment and range of motion.    SKIN: The skin is warm, dry and intact    NEUROLOGIC:   Cranial Nerves 2-12 intact  Normal motor strength noted. Muscle bulk and tone are normal.  Sensory exam is normal to fine touch to confrontational testing bilaterally  Reflexes on the right side demonstrates 1/4 Triceps Reflex, 1/4 Biceps Reflex, 0/4 Brachioradialis Reflex, 0/4 Knee Jerk Reflex, 1/4 Ankle Jerk Reflex and no ankle clonus on the right.   Reflexes on the left side demonstrates 1/4 Triceps Reflex, 1/4 Biceps Reflex, 0/4 Brachioradialis Reflex, 0/4 Knee Jerk Reflex, 1/4 Ankle Jerk Reflex and no ankle clonus on the left.  Superficial/Primitive Reflexes: primitive reflexes were absent.  Arce's, Babinski, and Clonus signs all negative.  No coordination deficit observed.  Radicular testing showed a negative Silvino (YARI) test and negative straight leg raise.  Spurling's maneuver is negative.  Cortical function is intact and without deficits. Speech is normal.    PSYCHIATRIC: oriented to person, place and time. Patient's mood and affect are normal.    Assessment/Plan   Independent Review of Radiographic Studies:      I personally reviewed the images from the following studies.    MRI of the lumbar spine done on November 18, 2020 at [facility] reveals a left foraminal disc protrusion L5-S1 which contacts the underside of the left L5 nerve root in the foramen with fairly severe left foraminal compromise.  Radiologist makes comment of some irregularity of the annulus at L3-4 and L4-5.    Medical Decision Making:      MRI of the lumbar spine did not reveal any severe stenosis there may be some left-sided neuroforaminal encroachment from disc protrusion at L5-S1 but she does  not have an L5 radiculopathy to correlate.  Given her pattern of chronic symptoms think she is an excellent candidate for physical therapy with an 80% likelihood in improvement of her symptoms. If she does not respond favorably she she could consult with an interventional pain management specialist but she was told by Dr. Giang that he would not approve any injections nor surgery because of right anticoagulation requirement.    Therefore, for nonsurgical management I will refer her to a physical medicine rehab specialist will be able to help her outline medical management and activity modification to control her symptoms long-term.    No follow-ups on file.    Diagnoses and all orders for this visit:    1. Chronic midline low back pain with bilateral sciatica (Primary)  -     Ambulatory Referral to Physical Therapy Evaluate and treat  -     Ambulatory Referral to Physical Medicine Rehab    2. Chronic neck pain  -     Ambulatory Referral to Physical Therapy Evaluate and treat  -     Ambulatory Referral to Physical Medicine Rehab             Richard Murillo MD FACS [facility]  Neurological Surgery

## 2020-12-11 ENCOUNTER — TELEPHONE (OUTPATIENT)
Dept: ONCOLOGY | Facility: CLINIC | Age: 55
End: 2020-12-11

## 2020-12-11 ENCOUNTER — DOCUMENTATION (OUTPATIENT)
Dept: ONCOLOGY | Facility: CLINIC | Age: 55
End: 2020-12-11

## 2020-12-11 NOTE — TELEPHONE ENCOUNTER
Patient would like to speak to Muna about directions for stopping Xarelto before oral surgery.  She has surgery scheduled for 12/14.    [phone]

## 2020-12-11 NOTE — TELEPHONE ENCOUNTER
Per Dr. Giang pt will stop Xarelto 2 days before the procedure.  Therefore, she would stop taking the medicine on 12/12/2020.  She would resume on 12/15/2020 if okay with the surgeon. Pt informed and V/U.

## 2020-12-22 ENCOUNTER — OFFICE VISIT (OUTPATIENT)
Dept: NEUROSURGERY | Facility: CLINIC | Age: 55
End: 2020-12-22

## 2020-12-22 VITALS
WEIGHT: 241 LBS | DIASTOLIC BLOOD PRESSURE: 73 MMHG | HEART RATE: 76 BPM | TEMPERATURE: 97.3 F | HEIGHT: 67 IN | SYSTOLIC BLOOD PRESSURE: 138 MMHG | BODY MASS INDEX: 37.83 KG/M2

## 2020-12-22 DIAGNOSIS — M54.41 CHRONIC MIDLINE LOW BACK PAIN WITH BILATERAL SCIATICA: Primary | ICD-10-CM

## 2020-12-22 DIAGNOSIS — G89.29 CHRONIC NECK PAIN: ICD-10-CM

## 2020-12-22 DIAGNOSIS — G89.29 CHRONIC MIDLINE LOW BACK PAIN WITH BILATERAL SCIATICA: Primary | ICD-10-CM

## 2020-12-22 DIAGNOSIS — M54.2 CHRONIC NECK PAIN: ICD-10-CM

## 2020-12-22 DIAGNOSIS — M54.42 CHRONIC MIDLINE LOW BACK PAIN WITH BILATERAL SCIATICA: Primary | ICD-10-CM

## 2020-12-22 PROCEDURE — 99244 OFF/OP CNSLTJ NEW/EST MOD 40: CPT | Performed by: NEUROLOGICAL SURGERY

## 2020-12-22 RX ORDER — HYDROCODONE BITARTRATE AND ACETAMINOPHEN 5; 325 MG/1; MG/1
TABLET ORAL
COMMUNITY
Start: 2020-12-21 | End: 2021-01-29

## 2020-12-22 RX ORDER — CLINDAMYCIN HYDROCHLORIDE 300 MG/1
CAPSULE ORAL
COMMUNITY
Start: 2020-12-21 | End: 2021-01-29

## 2021-01-07 ENCOUNTER — TELEPHONE (OUTPATIENT)
Dept: ONCOLOGY | Facility: CLINIC | Age: 56
End: 2021-01-07

## 2021-01-18 ENCOUNTER — TELEPHONE (OUTPATIENT)
Dept: GASTROENTEROLOGY | Facility: CLINIC | Age: 56
End: 2021-01-18

## 2021-01-18 NOTE — TELEPHONE ENCOUNTER
----- Message from Kylie Lottin sent at 1/18/2021 10:09 AM EST -----  Regarding: Prescription Question  Contact: [phone]  Could you [facility]ep[facility]ole 40mg 90 day supply [facility]ylie Bowie  [facility] [phone]  Thank you

## 2021-01-19 NOTE — TELEPHONE ENCOUNTER
Called pt and advised that she is due for her yearly f/u.  Pt verb understanding and made appt for 01/29 at 130p with Angelica OLIVER.

## 2021-01-29 ENCOUNTER — OFFICE VISIT (OUTPATIENT)
Dept: GASTROENTEROLOGY | Facility: CLINIC | Age: 56
End: 2021-01-29

## 2021-01-29 VITALS — TEMPERATURE: 96.5 F | WEIGHT: 240.2 LBS | HEIGHT: 67 IN | BODY MASS INDEX: 37.7 KG/M2

## 2021-01-29 DIAGNOSIS — K59.00 CONSTIPATION, UNSPECIFIED CONSTIPATION TYPE: ICD-10-CM

## 2021-01-29 DIAGNOSIS — K20.80 ESOPHAGITIS, LOS ANGELES GRADE B: Primary | ICD-10-CM

## 2021-01-29 DIAGNOSIS — K21.9 GASTROESOPHAGEAL REFLUX DISEASE: ICD-10-CM

## 2021-01-29 PROCEDURE — 99214 OFFICE O/P EST MOD 30 MIN: CPT | Performed by: NURSE PRACTITIONER

## 2021-01-29 RX ORDER — OMEPRAZOLE 40 MG/1
40 CAPSULE, DELAYED RELEASE ORAL DAILY
Qty: 90 CAPSULE | Refills: 3 | Status: SHIPPED | OUTPATIENT
Start: 2021-01-29 | End: 2023-02-20 | Stop reason: SDUPTHER

## 2021-01-29 NOTE — PROGRESS NOTES
Chief Complaint   Patient presents with   • Heartburn   • Med Refill       Kylie Bowie is a  56 y.o. female here for a follow up visit for GERD.    HPI  56-year-old female presents today for follow-up visit for GERD.  She is a patient of Dr. Flood.  She was last seen on 8/15/2018.  She is new to me today.  She has a history of GERD/LA grade B reflux esophagitis and admits she normally does well on omeprazole 40 mg daily.  Unfortunately she was running out of her prescription before this appointment so she is been trying to stretch it out and only been taking it a couple times a week.  This is caused lots of breakthrough reflux.  She also has a history of constipation but admits as long she takes over-the-counter stool softener she does well.  She denies any dysphagia, diarrhea, rectal bleeding or melena.  She is appetite is good and her weight is stable.  Her last EGD and colonoscopy was on 2017.  She does have a history of hyperplastic colon polyps.  Past Medical History:   Diagnosis Date   • Arthritis    • Asthma    • Beta thalassemia trait    • Factor V Leiden mutation (CMS/HCC)     HETEROZYGOUS   • GERD (gastroesophageal reflux disease)    • H/O Humeral head fracture    • H/O Left rotator cuff tear    • History of DVT of lower extremity    • History of iron deficiency anemia    • History of transfusion    • Hypertension    • Leiomyoma    • PONV (postoperative nausea and vomiting)    • Thrombophlebitis     Septic       Past Surgical History:   Procedure Laterality Date   • ANKLE SURGERY Left    •  SECTION  2003   • COLONOSCOPY N/A 2017    Two 2-3mm polypsin the sigmoid and in the transverse colon, NBIH.  PATH: Hyperplastic polyps   • DIAGNOSTIC LAPAROSCOPY EXPLORATORY LAPAROTOMY  2000   • ENDOSCOPY N/A 2017    LA Grade B reflux esophagitis, small HH, erythematous mucosa in the antrum.  PATH: Focal minimal chronic inflammation   • FOOT SURGERY Left     Paz  procedure with excision of accessory bone   • HYSTEROTOMY  04/30/1999   • MYOMECTOMY  2002       Scheduled Meds:    Continuous Infusions:No current facility-administered medications for this visit.       PRN Meds:.    Allergies   Allergen Reactions   • Keflex [Cephalexin]    • Latex    • Penicillins Unknown - Low Severity   • Amoxicillin-Pot Clavulanate Diarrhea       Social History     Socioeconomic History   • Marital status: Single     Spouse name: Not on file   • Number of children: 2   • Years of education: Not on file   • Highest education level: Not on file   Occupational History     Employer: [employer]   Tobacco Use   • Smoking status: Never Smoker   • Smokeless tobacco: Never Used   Substance and Sexual Activity   • Alcohol use: Yes     Comment: social   • Drug use: No   • Sexual activity: Defer       Family History   Problem Relation Age of Onset   • Heart disease Other    • Hypertension Mother    • Heart disease Mother    • Heart disease Father    • Diabetes Father    • Cancer Maternal Aunt         Breast   • Diabetes Maternal Aunt    • Lupus Paternal Aunt    • Cancer Maternal Grandmother    • Anemia Cousin    • Cancer Cousin    • Cancer Cousin         leukemia       Review of Systems   Constitutional: Negative for appetite change, chills, diaphoresis, fatigue, fever and unexpected weight change.   HENT: Negative for nosebleeds, postnasal drip, sore throat, trouble swallowing and voice change.    Respiratory: Negative for cough, choking, chest tightness, shortness of breath and wheezing.    Cardiovascular: Negative for chest pain, palpitations and leg swelling.   Gastrointestinal: Positive for abdominal distention and abdominal pain. Negative for anal bleeding, blood in stool, constipation, diarrhea, nausea, rectal pain and vomiting.   Endocrine: Negative for polydipsia, polyphagia and polyuria.   Musculoskeletal: Negative for gait problem.   Skin: Negative for rash and wound.    Allergic/Immunologic: Negative for food allergies.   Neurological: Negative for dizziness, speech difficulty and light-headedness.   Psychiatric/Behavioral: Negative for confusion, self-injury, sleep disturbance and suicidal ideas.       Vitals:    01/29/21 1405   Temp: 96.5 °F (35.8 °C)       Physical Exam  Constitutional:       General: She is not in acute distress.     Appearance: She is well-developed. She is not ill-appearing.   HENT:      Head: Normocephalic.   Eyes:      Pupils: Pupils are equal, round, and reactive to light.   Cardiovascular:      Rate and Rhythm: Normal rate and regular rhythm.      Heart sounds: Normal heart sounds.   Pulmonary:      Effort: Pulmonary effort is normal.      Breath sounds: Normal breath sounds.   Abdominal:      General: Bowel sounds are normal. There is distension.      Palpations: Abdomen is soft. There is no mass.      Tenderness: There is abdominal tenderness. There is no guarding or rebound.      Hernia: No hernia is present.       Musculoskeletal: Normal range of motion.   Skin:     General: Skin is warm and dry.   Neurological:      Mental Status: She is alert and oriented to person, place, and time.   Psychiatric:         Speech: Speech normal.         Behavior: Behavior normal.         Judgment: Judgment normal.         No radiology results for the last 7 days     Assessment and plan     1. Gastroesophageal reflux disease  - omeprazole (priLOSEC) 40 MG capsule; Take 1 capsule by mouth Daily.  Dispense: 90 capsule; Refill: 3    2. Esophagitis, Jerauld grade B    3. Constipation, unspecified constipation type      GERD is not well controlled since she has been having to skip doses due to shortage of her omeprazole.  When she was taking it daily it was working well for her.  I will go ahead and refill the omeprazole 40 mg daily for 1 year.  Continue GERD precautions.  Bowels seem to be moving well with her over-the-counter stool softeners.  Patient to call the  office with any issues.  Patient is agreeable to the plan.

## 2021-02-15 ENCOUNTER — HOSPITAL ENCOUNTER (OUTPATIENT)
Dept: CARDIOLOGY | Facility: HOSPITAL | Age: 56
Discharge: HOME | End: 2021-02-15

## 2021-02-15 ENCOUNTER — HOSPITAL ENCOUNTER (OUTPATIENT)
Dept: CT IMAGING | Facility: HOSPITAL | Age: 56
Discharge: HOME | End: 2021-02-15

## 2021-02-15 DIAGNOSIS — I26.99 BILATERAL PULMONARY EMBOLISM (HCC): ICD-10-CM

## 2021-02-15 DIAGNOSIS — I82.412 ACUTE DEEP VEIN THROMBOSIS (DVT) OF FEMORAL VEIN OF LEFT LOWER EXTREMITY (HCC): ICD-10-CM

## 2021-02-15 LAB
BH CV LOW VAS LEFT DISTAL FEMORAL SPONT: 1
BH CV LOW VAS LEFT POPLITEAL SPONT: 1
BH CV LOWER VASCULAR LEFT COMMON FEMORAL AUGMENT: NORMAL
BH CV LOWER VASCULAR LEFT COMMON FEMORAL COMPETENT: NORMAL
BH CV LOWER VASCULAR LEFT COMMON FEMORAL COMPRESS: NORMAL
BH CV LOWER VASCULAR LEFT COMMON FEMORAL PHASIC: NORMAL
BH CV LOWER VASCULAR LEFT COMMON FEMORAL SPONT: NORMAL
BH CV LOWER VASCULAR LEFT DISTAL FEMORAL COMPRESS: NORMAL
BH CV LOWER VASCULAR LEFT DISTAL FEMORAL THROMBUS: NORMAL
BH CV LOWER VASCULAR LEFT GASTRONEMIUS COMPRESS: NORMAL
BH CV LOWER VASCULAR LEFT GREATER SAPH AK COMPRESS: NORMAL
BH CV LOWER VASCULAR LEFT GREATER SAPH BK COMPRESS: NORMAL
BH CV LOWER VASCULAR LEFT LESSER SAPH COMPRESS: NORMAL
BH CV LOWER VASCULAR LEFT MID FEMORAL AUGMENT: NORMAL
BH CV LOWER VASCULAR LEFT MID FEMORAL COMPETENT: NORMAL
BH CV LOWER VASCULAR LEFT MID FEMORAL COMPRESS: NORMAL
BH CV LOWER VASCULAR LEFT MID FEMORAL PHASIC: NORMAL
BH CV LOWER VASCULAR LEFT MID FEMORAL SPONT: NORMAL
BH CV LOWER VASCULAR LEFT PERONEAL COMPRESS: NORMAL
BH CV LOWER VASCULAR LEFT POPLITEAL AUGMENT: NORMAL
BH CV LOWER VASCULAR LEFT POPLITEAL COMPETENT: NORMAL
BH CV LOWER VASCULAR LEFT POPLITEAL COMPRESS: NORMAL
BH CV LOWER VASCULAR LEFT POPLITEAL PHASIC: NORMAL
BH CV LOWER VASCULAR LEFT POPLITEAL SPONT: NORMAL
BH CV LOWER VASCULAR LEFT POPLITEAL THROMBUS: NORMAL
BH CV LOWER VASCULAR LEFT POSTERIOR TIBIAL COMPRESS: NORMAL
BH CV LOWER VASCULAR LEFT PROFUNDA FEMORAL COMPRESS: NORMAL
BH CV LOWER VASCULAR LEFT PROXIMAL FEMORAL COMPRESS: NORMAL
BH CV LOWER VASCULAR LEFT SAPHENOFEMORAL JUNCTION COMPRESS: NORMAL
BH CV LOWER VASCULAR RIGHT COMMON FEMORAL AUGMENT: NORMAL
BH CV LOWER VASCULAR RIGHT COMMON FEMORAL COMPETENT: NORMAL
BH CV LOWER VASCULAR RIGHT COMMON FEMORAL COMPRESS: NORMAL
BH CV LOWER VASCULAR RIGHT COMMON FEMORAL PHASIC: NORMAL
BH CV LOWER VASCULAR RIGHT COMMON FEMORAL SPONT: NORMAL
BH CV LOWER VASCULAR RIGHT DISTAL FEMORAL COMPRESS: NORMAL
BH CV LOWER VASCULAR RIGHT GASTRONEMIUS COMPRESS: NORMAL
BH CV LOWER VASCULAR RIGHT GREATER SAPH AK COMPRESS: NORMAL
BH CV LOWER VASCULAR RIGHT GREATER SAPH BK COMPRESS: NORMAL
BH CV LOWER VASCULAR RIGHT LESSER SAPH COMPRESS: NORMAL
BH CV LOWER VASCULAR RIGHT MID FEMORAL AUGMENT: NORMAL
BH CV LOWER VASCULAR RIGHT MID FEMORAL COMPETENT: NORMAL
BH CV LOWER VASCULAR RIGHT MID FEMORAL COMPRESS: NORMAL
BH CV LOWER VASCULAR RIGHT MID FEMORAL PHASIC: NORMAL
BH CV LOWER VASCULAR RIGHT MID FEMORAL SPONT: NORMAL
BH CV LOWER VASCULAR RIGHT PERONEAL COMPRESS: NORMAL
BH CV LOWER VASCULAR RIGHT POPLITEAL AUGMENT: NORMAL
BH CV LOWER VASCULAR RIGHT POPLITEAL COMPETENT: NORMAL
BH CV LOWER VASCULAR RIGHT POPLITEAL COMPRESS: NORMAL
BH CV LOWER VASCULAR RIGHT POPLITEAL PHASIC: NORMAL
BH CV LOWER VASCULAR RIGHT POPLITEAL SPONT: NORMAL
BH CV LOWER VASCULAR RIGHT POSTERIOR TIBIAL COMPRESS: NORMAL
BH CV LOWER VASCULAR RIGHT PROFUNDA FEMORAL COMPRESS: NORMAL
BH CV LOWER VASCULAR RIGHT PROXIMAL FEMORAL COMPRESS: NORMAL
BH CV LOWER VASCULAR RIGHT SAPHENOFEMORAL JUNCTION COMPRESS: NORMAL
CREAT BLDA-MCNC: 1.2 MG/DL (ref 0.6–1.3)

## 2021-02-15 PROCEDURE — 71275 CT ANGIOGRAPHY CHEST: CPT

## 2021-02-15 PROCEDURE — 93970 EXTREMITY STUDY: CPT

## 2021-02-15 PROCEDURE — 0 IOPAMIDOL PER 1 ML: Performed by: INTERNAL MEDICINE

## 2021-02-15 PROCEDURE — 82565 ASSAY OF CREATININE: CPT

## 2021-02-15 RX ADMIN — IOPAMIDOL 95 ML: 755 INJECTION, SOLUTION INTRAVENOUS at 11:28

## 2021-02-15 NOTE — NURSING NOTE
Dr. Kovacs called and stated patient may go. Ambulated out of xray per self and going to vascular lab for doppler study.

## 2021-02-18 ENCOUNTER — APPOINTMENT (OUTPATIENT)
Dept: LAB | Facility: HOSPITAL | Age: 56
End: 2021-02-18

## 2021-02-23 ENCOUNTER — OFFICE VISIT (OUTPATIENT)
Dept: ONCOLOGY | Facility: CLINIC | Age: 56
End: 2021-02-23

## 2021-02-23 ENCOUNTER — LAB (OUTPATIENT)
Dept: LAB | Facility: HOSPITAL | Age: 56
End: 2021-02-23

## 2021-02-23 VITALS
TEMPERATURE: 97.7 F | DIASTOLIC BLOOD PRESSURE: 110 MMHG | OXYGEN SATURATION: 97 % | HEART RATE: 76 BPM | RESPIRATION RATE: 18 BRPM | HEIGHT: 67 IN | BODY MASS INDEX: 38.96 KG/M2 | SYSTOLIC BLOOD PRESSURE: 167 MMHG | WEIGHT: 248.2 LBS

## 2021-02-23 DIAGNOSIS — I26.99 BILATERAL PULMONARY EMBOLISM (HCC): ICD-10-CM

## 2021-02-23 DIAGNOSIS — I82.512 CHRONIC DEEP VEIN THROMBOSIS (DVT) OF LEFT FEMORAL VEIN (HCC): ICD-10-CM

## 2021-02-23 DIAGNOSIS — D50.0 IRON DEFICIENCY ANEMIA DUE TO CHRONIC BLOOD LOSS: ICD-10-CM

## 2021-02-23 DIAGNOSIS — D50.0 IRON DEFICIENCY ANEMIA DUE TO CHRONIC BLOOD LOSS: Primary | ICD-10-CM

## 2021-02-23 DIAGNOSIS — T45.4X5A ADVERSE EFFECT OF PREPARATION OF IRON COMPOUND, INITIAL ENCOUNTER: ICD-10-CM

## 2021-02-23 LAB
BASOPHILS # BLD AUTO: 0.02 10*3/MM3 (ref 0–0.2)
BASOPHILS NFR BLD AUTO: 0.2 % (ref 0–1.5)
DEPRECATED RDW RBC AUTO: 46.6 FL (ref 37–54)
EOSINOPHIL # BLD AUTO: 0.06 10*3/MM3 (ref 0–0.4)
EOSINOPHIL NFR BLD AUTO: 0.6 % (ref 0.3–6.2)
ERYTHROCYTE [DISTWIDTH] IN BLOOD BY AUTOMATED COUNT: 15.8 % (ref 12.3–15.4)
FERRITIN SERPL-MCNC: 41.3 NG/ML (ref 11–207)
HCT VFR BLD AUTO: 36.2 % (ref 34–46.6)
HGB BLD-MCNC: 11.9 G/DL (ref 12–15.9)
IMM GRANULOCYTES # BLD AUTO: 0.03 10*3/MM3 (ref 0–0.05)
IMM GRANULOCYTES NFR BLD AUTO: 0.3 % (ref 0–0.5)
IRON 24H UR-MRATE: 43 MCG/DL (ref 37–145)
IRON SATN MFR SERPL: 10 % (ref 14–48)
LYMPHOCYTES # BLD AUTO: 2.2 10*3/MM3 (ref 0.7–3.1)
LYMPHOCYTES NFR BLD AUTO: 23.4 % (ref 19.6–45.3)
MCH RBC QN AUTO: 27.1 PG (ref 26.6–33)
MCHC RBC AUTO-ENTMCNC: 32.9 G/DL (ref 31.5–35.7)
MCV RBC AUTO: 82.5 FL (ref 79–97)
MONOCYTES # BLD AUTO: 0.84 10*3/MM3 (ref 0.1–0.9)
MONOCYTES NFR BLD AUTO: 8.9 % (ref 5–12)
NEUTROPHILS NFR BLD AUTO: 6.25 10*3/MM3 (ref 1.7–7)
NEUTROPHILS NFR BLD AUTO: 66.6 % (ref 42.7–76)
NRBC BLD AUTO-RTO: 0 /100 WBC (ref 0–0.2)
PLATELET # BLD AUTO: 299 10*3/MM3 (ref 140–450)
PMV BLD AUTO: 9.9 FL (ref 6–12)
RBC # BLD AUTO: 4.39 10*6/MM3 (ref 3.77–5.28)
TIBC SERPL-MCNC: 430 MCG/DL (ref 249–505)
TRANSFERRIN SERPL-MCNC: 307 MG/DL (ref 200–360)
WBC # BLD AUTO: 9.4 10*3/MM3 (ref 3.4–10.8)

## 2021-02-23 PROCEDURE — 85025 COMPLETE CBC W/AUTO DIFF WBC: CPT

## 2021-02-23 PROCEDURE — 36415 COLL VENOUS BLD VENIPUNCTURE: CPT

## 2021-02-23 PROCEDURE — 82728 ASSAY OF FERRITIN: CPT

## 2021-02-23 PROCEDURE — 99214 OFFICE O/P EST MOD 30 MIN: CPT | Performed by: INTERNAL MEDICINE

## 2021-02-23 PROCEDURE — 83540 ASSAY OF IRON: CPT

## 2021-02-23 PROCEDURE — 84466 ASSAY OF TRANSFERRIN: CPT

## 2021-02-23 RX ORDER — DIPHENHYDRAMINE HCL 25 MG
25 CAPSULE ORAL ONCE
Status: CANCELLED | OUTPATIENT
Start: 2021-02-25

## 2021-02-23 RX ORDER — FAMOTIDINE 10 MG/ML
20 INJECTION, SOLUTION INTRAVENOUS ONCE
Status: CANCELLED | OUTPATIENT
Start: 2021-03-04

## 2021-02-23 RX ORDER — SODIUM CHLORIDE 9 MG/ML
250 INJECTION, SOLUTION INTRAVENOUS ONCE
Status: CANCELLED | OUTPATIENT
Start: 2021-03-04

## 2021-02-23 RX ORDER — FAMOTIDINE 10 MG/ML
20 INJECTION, SOLUTION INTRAVENOUS ONCE
Status: CANCELLED | OUTPATIENT
Start: 2021-02-25

## 2021-02-23 RX ORDER — SODIUM CHLORIDE 9 MG/ML
250 INJECTION, SOLUTION INTRAVENOUS ONCE
Status: CANCELLED | OUTPATIENT
Start: 2021-02-25

## 2021-02-23 RX ORDER — DIPHENHYDRAMINE HCL 25 MG
25 CAPSULE ORAL ONCE
Status: CANCELLED | OUTPATIENT
Start: 2021-03-04

## 2021-02-23 NOTE — PROGRESS NOTES
Subjective     CHIEF COMPLAINT:      Chief Complaint   Patient presents with   • Follow-up     pain in left leg       HISTORY OF PRESENT ILLNESS:     Kylie Bowie is a 56 y.o. female patient who returns today for follow up on her factor V Leiden mutation and recurrent thrombosis. She is on Xarelto 20 mg daily. She is tolerating it well. She is not having problem with bleeding. She is no longer having problem with the heavy menstrual bleeding.     Patient reports intermittent pain in the left leg lateral aspect.  It was present when she had the venous doppler done on 2/15/2021.    Patient is not able to take the oral iron due to development of severe constipation.     Past medical, surgical, social and family history reviewed.     MEDICATIONS:    Current Outpatient Medications:   •  albuterol (PROVENTIL HFA;VENTOLIN HFA) 108 (90 BASE) MCG/ACT inhaler, Inhale 2 puffs every 4 (four) hours as needed for wheezing., Disp: , Rfl:   •  atorvastatin (LIPITOR) 10 MG tablet, Take 20 mg by mouth Daily., Disp: , Rfl:   •  chlorthalidone (HYGROTON) 25 MG tablet, , Disp: , Rfl:   •  diclofenac (VOLTAREN) 1 % gel gel, APPLY 4 GRAMS TO THE AFFECTED AREA FOUR TIMES DAILY, Disp: , Rfl: 2  •  ferrous sulfate 325 (65 FE) MG tablet, Take 1 tablet by mouth Daily With Breakfast., Disp: 30 tablet, Rfl: 11  •  fluticasone (FLONASE) 50 MCG/ACT nasal spray, 2 sprays into the nostril(s) as directed by provider Daily. Administer 2 sprays in each nostril for each dose., Disp: 3 bottle, Rfl: 1  •  lisinopril (PRINIVIL,ZESTRIL) 20 MG tablet, Take 20 mg by mouth daily., Disp: , Rfl:   •  montelukast (SINGULAIR) 10 MG tablet, Take 1 tablet by mouth Every Night., Disp: 90 tablet, Rfl: 1  •  omeprazole (priLOSEC) 40 MG capsule, Take 1 capsule by mouth Daily., Disp: 90 capsule, Rfl: 3  •  rivaroxaban (XARELTO) 20 MG tablet, Take 1 tablet by mouth Daily., Disp: 90 tablet, Rfl: 3  •  triamcinolone (KENALOG) 0.1 % ointment, Apply  topically to the  "appropriate area as directed 2 (Two) Times a Day., Disp: 30 g, Rfl: 1    Objective   VITAL SIGNS:     Vitals:    02/23/21 1610   BP: (!) 167/110   Pulse: 76   Resp: 18   Temp: 97.7 °F (36.5 °C)   TempSrc: Temporal   SpO2: 97%   Weight: 113 kg (248 lb 3.2 oz)   Height: 170.2 cm (67.01\")   PainSc:   5   PainLoc: Leg     Body mass index is 38.86 kg/m².     Wt Readings from Last 5 Encounters:   02/23/21 113 kg (248 lb 3.2 oz)   01/29/21 109 kg (240 lb 3.2 oz)   12/22/20 109 kg (241 lb)   10/29/20 109 kg (241 lb)   09/22/20 108 kg (237 lb 4.8 oz)       PHYSICAL EXAMINATION:   GENERAL: The patient appears in good general condition, not in acute distress.   SKIN: No ecchymosis.  EYES: No jaundice.  CHEST: Normal respiratory effort.   CVS: Trace leg edema.   EXTREMITIES: No calf tenderness. There is tenderness in the left leg lateral aspect. No erythema or warmth.    DIAGNOSTIC DATA:     Results from last 7 days   Lab Units 02/23/21  1600   WBC 10*3/mm3 9.40   NEUTROS ABS 10*3/mm3 6.25   HEMOGLOBIN g/dL 11.9*   HEMATOCRIT % 36.2   PLATELETS 10*3/mm3 299     Component      Latest Ref Rng & Units 10/29/2020 2/23/2021   Iron      37 - 145 mcg/dL 49 43   Iron Saturation      14 - 48 % 12 (L) 10 (L)   Transferrin      200 - 360 mg/dL 289 307   TIBC      249 - 505 mcg/dL 405 430   Ferritin      11.00 - 207.00 ng/mL 61.40 41.30     CT angiogram on 2/15/2021:  1. No residual or recurrent pulmonary embolus is demonstrated.  2. No acute pulmonary process identified.  3. Old right rib deformities.    Venous Doppler lower extremities on 2/15/2021:  · Chronic left lower extremity deep vein thrombosis noted in the distal femoral and popliteal.  · All other veins appeared normal bilaterally.      Assessment/Plan   1.  Factor V Leiden heterozygous mutation.  · She has a history of left lower extremity DVT that developed after a myomectomy, D and C in 1999.   · She had a foot surgery and was placed on Xarelto 10 mg daily after the surgery. "   · Patient was diagnosed with acute PE and left lower extremity DVT on 8/19/2020 as below.  · Based on recurrent thrombosis and the development of acute DVT and PE in August 2020 that were unprovoked, lifelong anticoagulation was recommended.  · Venous doppler on 2/15/2021 revealed a chronic thrombus in the distal femoral and popliteal veins.  · Patient is tolerating Xarelto.     2.  Acute bilateral pulmonary embolism diagnosed on 8/19/2020.    · There was no evidence of right heart strain.    · She was found to have acute left lower extremity deep vein thrombosis in the left femoral, popliteal, posterior tibial, peroneal veins and gastrocnemius/soleal vein.  · DVT and PE were unprovoked.    · She was hospitalized at [facility] on 8/19/2020 and anticoagulated with Lovenox.    · VQ scan on 9/9/2020 showed no perfusion defects.    · Venous Doppler on 9/9/2020 showed the thrombosis to have become chronic.  · On 9/22/2020, she was tachycardic with heart rate of 115.  O2 saturation was 97%.  · CT angiogram on 2/15/2021 revealed resolution of the pulmonary embolism.   · Due to the factor V Leiden mutation and the recurrent thrombosis, I recommended life-long anticoagulation.   · I recommended continuing Xarelto 20 mg daily.     3.  Vaginal bleeding secondary to uterine fibroids.    · Patient reported to have enlargement of the uterus to a 20 week gestation size.    · She was referred to gynecology oncology and surgery was recommended to be done ASAP.    · They recommended having an IVC filter placed in preparation for the surgery.  · Due to the timeframe from the diagnosis of a PE and since the patient was symptomatic in September 2020, the recommendation was to do radiosurgery ~6 months from the time of diagnosis of the PE (February 2021).  · Patient is no longer having heavy menstrual bleeding.   · The patient states she does not want to have the surgery done in the immediate future.     4.  Anemia secondary to  iron deficiency in a patient with Beta thalassemia trait.   · She was placed on ferrous sulfate 325 mg a day on 4/25/2019.  She took the ferrous sulfate for 6 months.    · Hemoglobin was normal at 13.2 on 7/30/2020.    · Hemoglobin dropped to 11.7 on 9/22/2020.  · Patient was started on ferrous sulfate 325 mg daily on 9/22/2020.  · Hemoglobin remains low.   · Patient did not tolerate the ferrous sulfate with development of severe constipation.   · I recommended IV iron in the form of IV Injectafer x 2 doses.       PLAN:    1.  Continue Xarelto 20 mg daily.  2.  IV Injectafer x 2 doses 1 week apart.  3.  Apply ice to the left leg every 4 hours as needed.   4.  Repeat CBC Ferritin and iron panel in 3 months.   5.  Follow up in 6 months with a venous doppler of the left lower extremity 1 week before her return visit. CBC Ferritin and iron panel will be repeated at her return visit.       Merly Giang MD  02/23/21

## 2021-02-24 ENCOUNTER — TELEPHONE (OUTPATIENT)
Dept: ONCOLOGY | Facility: CLINIC | Age: 56
End: 2021-02-24

## 2021-02-24 ENCOUNTER — TELEPHONE (OUTPATIENT)
Dept: GENERAL RADIOLOGY | Facility: HOSPITAL | Age: 56
End: 2021-02-24

## 2021-02-24 NOTE — TELEPHONE ENCOUNTER
----- Message from Merly Giang MD sent at 2/23/2021  5:57 PM EST -----  Please inform the patient that her iron is low. Please arrange for her to receive IV Injectafer x 2 doses 1 week apart.     Thank you

## 2021-02-24 NOTE — TELEPHONE ENCOUNTER
----- Message from Candice Das RN sent at 2/24/2021  8:58 AM EST -----  Please schedule two doses of injectafer, she is aware    Thank you!  ----- Message -----  From: Merly Giang MD  Sent: 2/23/2021   5:57 PM EST  To: Candice Das RN    Please inform the patient that her iron is low. Please arrange for her to receive IV Injectafer x 2 doses 1 week apart.     Thank you

## 2021-02-26 ENCOUNTER — DOCUMENTATION (OUTPATIENT)
Dept: ONCOLOGY | Facility: CLINIC | Age: 56
End: 2021-02-26

## 2021-02-26 ENCOUNTER — INFUSION (OUTPATIENT)
Dept: ONCOLOGY | Facility: HOSPITAL | Age: 56
End: 2021-02-26

## 2021-02-26 VITALS
SYSTOLIC BLOOD PRESSURE: 138 MMHG | WEIGHT: 245.2 LBS | RESPIRATION RATE: 16 BRPM | HEART RATE: 73 BPM | OXYGEN SATURATION: 98 % | TEMPERATURE: 97.1 F | BODY MASS INDEX: 38.39 KG/M2 | DIASTOLIC BLOOD PRESSURE: 89 MMHG

## 2021-02-26 DIAGNOSIS — D50.0 IRON DEFICIENCY ANEMIA DUE TO CHRONIC BLOOD LOSS: ICD-10-CM

## 2021-02-26 DIAGNOSIS — T45.4X5A ADVERSE EFFECT OF PREPARATION OF IRON COMPOUND, INITIAL ENCOUNTER: Primary | ICD-10-CM

## 2021-02-26 PROCEDURE — 25010000002 FERRIC CARBOXYMALTOSE 750 MG/15ML SOLUTION 15 ML VIAL: Performed by: INTERNAL MEDICINE

## 2021-02-26 PROCEDURE — 96375 TX/PRO/DX INJ NEW DRUG ADDON: CPT

## 2021-02-26 PROCEDURE — 96374 THER/PROPH/DIAG INJ IV PUSH: CPT

## 2021-02-26 RX ORDER — SODIUM CHLORIDE 9 MG/ML
250 INJECTION, SOLUTION INTRAVENOUS ONCE
Status: COMPLETED | OUTPATIENT
Start: 2021-02-26 | End: 2021-02-26

## 2021-02-26 RX ORDER — DIPHENHYDRAMINE HCL 25 MG
25 CAPSULE ORAL ONCE
Status: DISCONTINUED | OUTPATIENT
Start: 2021-02-26 | End: 2021-02-26 | Stop reason: HOSPADM

## 2021-02-26 RX ORDER — FAMOTIDINE 10 MG/ML
20 INJECTION, SOLUTION INTRAVENOUS ONCE
Status: COMPLETED | OUTPATIENT
Start: 2021-02-26 | End: 2021-02-26

## 2021-02-26 RX ADMIN — SODIUM CHLORIDE 250 ML: 9 INJECTION, SOLUTION INTRAVENOUS at 10:31

## 2021-02-26 RX ADMIN — FAMOTIDINE 20 MG: 10 INJECTION, SOLUTION INTRAVENOUS at 10:31

## 2021-02-26 RX ADMIN — FERRIC CARBOXYMALTOSE INJECTION 750 MG: 50 INJECTION, SOLUTION INTRAVENOUS at 10:34

## 2021-02-26 NOTE — PROGRESS NOTES
"I have been notified by Kennedi Ordoñez that pt is receiving Injectafer. She has commercial insurance and can use the copay card. I have enrolled pt and sent all billing information to Sarah Sneed for processing.     Under the Injectafer Savings Program, eligible insured patients may pay no more than $50 for Injectafer for the first dose and $0 for Injectafer for the second dose, up to a maximum savings limit of $500 per dose, a $1,000 program limit per course of therapy.\"The offer is valid for 2 courses, or 4 doses, of an Injectafer prescription. One enrollment is allowed per 12-month period.    "

## 2021-03-05 ENCOUNTER — TELEPHONE (OUTPATIENT)
Dept: ONCOLOGY | Facility: CLINIC | Age: 56
End: 2021-03-05

## 2021-03-05 ENCOUNTER — APPOINTMENT (OUTPATIENT)
Dept: ONCOLOGY | Facility: HOSPITAL | Age: 56
End: 2021-03-05

## 2021-03-05 NOTE — TELEPHONE ENCOUNTER
----- Message from Aby Smith sent at 3/5/2021  9:01 AM EST -----  Pt wants to cancel today's appt and try and reschedule for next Fri around 4.  Said she looks at [employer] so she will see it on there and you don't have to call her.    Jo Ann

## 2021-03-12 ENCOUNTER — INFUSION (OUTPATIENT)
Dept: ONCOLOGY | Facility: HOSPITAL | Age: 56
End: 2021-03-12

## 2021-03-12 VITALS
HEART RATE: 80 BPM | SYSTOLIC BLOOD PRESSURE: 116 MMHG | BODY MASS INDEX: 37.55 KG/M2 | OXYGEN SATURATION: 96 % | RESPIRATION RATE: 16 BRPM | TEMPERATURE: 97.1 F | WEIGHT: 239.8 LBS | DIASTOLIC BLOOD PRESSURE: 77 MMHG

## 2021-03-12 DIAGNOSIS — D50.0 IRON DEFICIENCY ANEMIA DUE TO CHRONIC BLOOD LOSS: ICD-10-CM

## 2021-03-12 DIAGNOSIS — T45.4X5A ADVERSE EFFECT OF PREPARATION OF IRON COMPOUND, INITIAL ENCOUNTER: Primary | ICD-10-CM

## 2021-03-12 PROCEDURE — 96375 TX/PRO/DX INJ NEW DRUG ADDON: CPT

## 2021-03-12 PROCEDURE — 96374 THER/PROPH/DIAG INJ IV PUSH: CPT

## 2021-03-12 PROCEDURE — 63710000001 PROCHLORPERAZINE MALEATE PER 10 MG: Performed by: INTERNAL MEDICINE

## 2021-03-12 PROCEDURE — 25010000002 FERRIC CARBOXYMALTOSE 750 MG/15ML SOLUTION 15 ML VIAL: Performed by: INTERNAL MEDICINE

## 2021-03-12 RX ORDER — PROCHLORPERAZINE MALEATE 10 MG
10 TABLET ORAL ONCE
Status: COMPLETED | OUTPATIENT
Start: 2021-03-12 | End: 2021-03-12

## 2021-03-12 RX ORDER — FAMOTIDINE 10 MG/ML
20 INJECTION, SOLUTION INTRAVENOUS ONCE
Status: COMPLETED | OUTPATIENT
Start: 2021-03-12 | End: 2021-03-12

## 2021-03-12 RX ORDER — SODIUM CHLORIDE 9 MG/ML
250 INJECTION, SOLUTION INTRAVENOUS ONCE
Status: COMPLETED | OUTPATIENT
Start: 2021-03-12 | End: 2021-03-12

## 2021-03-12 RX ADMIN — SODIUM CHLORIDE 250 ML: 9 INJECTION, SOLUTION INTRAVENOUS at 15:38

## 2021-03-12 RX ADMIN — FAMOTIDINE 20 MG: 10 INJECTION INTRAVENOUS at 15:37

## 2021-03-12 RX ADMIN — PROCHLORPERAZINE MALEATE 10 MG: 10 TABLET ORAL at 15:37

## 2021-03-12 RX ADMIN — FERRIC CARBOXYMALTOSE INJECTION 750 MG: 50 INJECTION, SOLUTION INTRAVENOUS at 15:42

## 2021-03-24 ENCOUNTER — BULK ORDERING (OUTPATIENT)
Dept: CASE MANAGEMENT | Facility: OTHER | Age: 56
End: 2021-03-24

## 2021-03-24 DIAGNOSIS — Z23 IMMUNIZATION DUE: ICD-10-CM

## 2021-05-18 ENCOUNTER — APPOINTMENT (OUTPATIENT)
Dept: LAB | Facility: HOSPITAL | Age: 56
End: 2021-05-18

## 2021-06-09 DIAGNOSIS — I26.99 BILATERAL PULMONARY EMBOLISM (HCC): Primary | ICD-10-CM

## 2021-06-09 NOTE — TELEPHONE ENCOUNTER
Notified pt of recommendations to hold Xarelto, she v/u.    ----- Message from Merly Giang MD sent at 6/9/2021  9:34 AM EDT -----  I would recommend holding Xarelto 2 days before the injection and starting back the day after the injection.    Thank you  ----- Message -----  From: Makayla Mckinney RN  Sent: 6/9/2021   9:20 AM EDT  To: Merly Giang MD    Pt is having a hip injection as ordered by her ortho MD on 6/17 and is asking for instructions regarding Xarelto prior to this. Please advise. Thanks.        
Pt requesting 30 day supply of Xarelto while waiting for her mail order pharmacy to get a 90 day supply to her. Script routed to Dr. Giang for approval. Pt also scheduled for hip injection on 6/17 and asking for instructions on holding Xarelto. Message sent to Dr. Ginag for instructions. Pt v/u.  
No

## 2021-08-10 ENCOUNTER — APPOINTMENT (OUTPATIENT)
Dept: LAB | Facility: HOSPITAL | Age: 56
End: 2021-08-10

## 2021-08-20 ENCOUNTER — HOSPITAL ENCOUNTER (OUTPATIENT)
Dept: CARDIOLOGY | Facility: HOSPITAL | Age: 56
Discharge: HOME | End: 2021-08-20
Admitting: INTERNAL MEDICINE

## 2021-08-20 DIAGNOSIS — I82.512 CHRONIC DEEP VEIN THROMBOSIS (DVT) OF LEFT FEMORAL VEIN (HCC): ICD-10-CM

## 2021-08-20 PROCEDURE — 93971 EXTREMITY STUDY: CPT

## 2021-08-21 LAB
BH CV LOW VAS LEFT DISTAL FEMORAL SPONT: 1
BH CV LOW VAS LEFT GASTRONEMIUS VESSEL: 1
BH CV LOW VAS LEFT POPLITEAL SPONT: 1
BH CV LOWER VASCULAR LEFT COMMON FEMORAL AUGMENT: NORMAL
BH CV LOWER VASCULAR LEFT COMMON FEMORAL COMPETENT: NORMAL
BH CV LOWER VASCULAR LEFT COMMON FEMORAL COMPRESS: NORMAL
BH CV LOWER VASCULAR LEFT COMMON FEMORAL PHASIC: NORMAL
BH CV LOWER VASCULAR LEFT COMMON FEMORAL SPONT: NORMAL
BH CV LOWER VASCULAR LEFT DISTAL FEMORAL COMPRESS: NORMAL
BH CV LOWER VASCULAR LEFT DISTAL FEMORAL THROMBUS: NORMAL
BH CV LOWER VASCULAR LEFT GASTRONEMIUS COMPRESS: NORMAL
BH CV LOWER VASCULAR LEFT GASTRONEMIUS THROMBUS: NORMAL
BH CV LOWER VASCULAR LEFT GREATER SAPH AK COMPRESS: NORMAL
BH CV LOWER VASCULAR LEFT GREATER SAPH BK COMPRESS: NORMAL
BH CV LOWER VASCULAR LEFT LESSER SAPH COMPRESS: NORMAL
BH CV LOWER VASCULAR LEFT MID FEMORAL AUGMENT: NORMAL
BH CV LOWER VASCULAR LEFT MID FEMORAL COMPETENT: NORMAL
BH CV LOWER VASCULAR LEFT MID FEMORAL COMPRESS: NORMAL
BH CV LOWER VASCULAR LEFT MID FEMORAL PHASIC: NORMAL
BH CV LOWER VASCULAR LEFT MID FEMORAL SPONT: NORMAL
BH CV LOWER VASCULAR LEFT PERONEAL COMPRESS: NORMAL
BH CV LOWER VASCULAR LEFT POPLITEAL AUGMENT: NORMAL
BH CV LOWER VASCULAR LEFT POPLITEAL COMPETENT: NORMAL
BH CV LOWER VASCULAR LEFT POPLITEAL COMPRESS: NORMAL
BH CV LOWER VASCULAR LEFT POPLITEAL PHASIC: NORMAL
BH CV LOWER VASCULAR LEFT POPLITEAL SPONT: NORMAL
BH CV LOWER VASCULAR LEFT POPLITEAL THROMBUS: NORMAL
BH CV LOWER VASCULAR LEFT POSTERIOR TIBIAL COMPRESS: NORMAL
BH CV LOWER VASCULAR LEFT PROFUNDA FEMORAL COMPRESS: NORMAL
BH CV LOWER VASCULAR LEFT PROXIMAL FEMORAL COMPRESS: NORMAL
BH CV LOWER VASCULAR LEFT SAPHENOFEMORAL JUNCTION COMPRESS: NORMAL
BH CV LOWER VASCULAR RIGHT COMMON FEMORAL AUGMENT: NORMAL
BH CV LOWER VASCULAR RIGHT COMMON FEMORAL COMPETENT: NORMAL
BH CV LOWER VASCULAR RIGHT COMMON FEMORAL COMPRESS: NORMAL
BH CV LOWER VASCULAR RIGHT COMMON FEMORAL PHASIC: NORMAL
BH CV LOWER VASCULAR RIGHT COMMON FEMORAL SPONT: NORMAL

## 2021-08-24 ENCOUNTER — OFFICE VISIT (OUTPATIENT)
Dept: ONCOLOGY | Facility: CLINIC | Age: 56
End: 2021-08-24

## 2021-08-24 ENCOUNTER — LAB (OUTPATIENT)
Dept: LAB | Facility: HOSPITAL | Age: 56
End: 2021-08-24

## 2021-08-24 VITALS
HEIGHT: 67 IN | RESPIRATION RATE: 16 BRPM | HEART RATE: 83 BPM | SYSTOLIC BLOOD PRESSURE: 126 MMHG | DIASTOLIC BLOOD PRESSURE: 89 MMHG | BODY MASS INDEX: 38.2 KG/M2 | TEMPERATURE: 98 F | OXYGEN SATURATION: 98 % | WEIGHT: 243.4 LBS

## 2021-08-24 DIAGNOSIS — D50.0 IRON DEFICIENCY ANEMIA DUE TO CHRONIC BLOOD LOSS: ICD-10-CM

## 2021-08-24 DIAGNOSIS — D68.51 FACTOR V LEIDEN MUTATION (HCC): Primary | ICD-10-CM

## 2021-08-24 DIAGNOSIS — I26.99 BILATERAL PULMONARY EMBOLISM (HCC): ICD-10-CM

## 2021-08-24 DIAGNOSIS — D25.9 UTERINE LEIOMYOMA, UNSPECIFIED LOCATION: ICD-10-CM

## 2021-08-24 DIAGNOSIS — I82.512 CHRONIC DEEP VEIN THROMBOSIS (DVT) OF LEFT FEMORAL VEIN (HCC): ICD-10-CM

## 2021-08-24 LAB
BASOPHILS # BLD AUTO: 0.02 10*3/MM3 (ref 0–0.2)
BASOPHILS NFR BLD AUTO: 0.2 % (ref 0–1.5)
DEPRECATED RDW RBC AUTO: 41.2 FL (ref 37–54)
EOSINOPHIL # BLD AUTO: 0 10*3/MM3 (ref 0–0.4)
EOSINOPHIL NFR BLD AUTO: 0 % (ref 0.3–6.2)
ERYTHROCYTE [DISTWIDTH] IN BLOOD BY AUTOMATED COUNT: 13.4 % (ref 12.3–15.4)
FERRITIN SERPL-MCNC: 609.2 NG/ML (ref 11–207)
HCT VFR BLD AUTO: 41.1 % (ref 34–46.6)
HGB BLD-MCNC: 13.8 G/DL (ref 12–15.9)
IMM GRANULOCYTES # BLD AUTO: 0.02 10*3/MM3 (ref 0–0.05)
IMM GRANULOCYTES NFR BLD AUTO: 0.2 % (ref 0–0.5)
IRON 24H UR-MRATE: 86 MCG/DL (ref 37–145)
IRON SATN MFR SERPL: 25 % (ref 14–48)
LYMPHOCYTES # BLD AUTO: 1.53 10*3/MM3 (ref 0.7–3.1)
LYMPHOCYTES NFR BLD AUTO: 15.4 % (ref 19.6–45.3)
MCH RBC QN AUTO: 28.2 PG (ref 26.6–33)
MCHC RBC AUTO-ENTMCNC: 33.6 G/DL (ref 31.5–35.7)
MCV RBC AUTO: 84 FL (ref 79–97)
MONOCYTES # BLD AUTO: 0.67 10*3/MM3 (ref 0.1–0.9)
MONOCYTES NFR BLD AUTO: 6.8 % (ref 5–12)
NEUTROPHILS NFR BLD AUTO: 7.68 10*3/MM3 (ref 1.7–7)
NEUTROPHILS NFR BLD AUTO: 77.4 % (ref 42.7–76)
NRBC BLD AUTO-RTO: 0 /100 WBC (ref 0–0.2)
PLATELET # BLD AUTO: 267 10*3/MM3 (ref 140–450)
PMV BLD AUTO: 10.3 FL (ref 6–12)
RBC # BLD AUTO: 4.89 10*6/MM3 (ref 3.77–5.28)
TIBC SERPL-MCNC: 347 MCG/DL (ref 249–505)
TRANSFERRIN SERPL-MCNC: 248 MG/DL (ref 200–360)
WBC # BLD AUTO: 9.92 10*3/MM3 (ref 3.4–10.8)

## 2021-08-24 PROCEDURE — 85025 COMPLETE CBC W/AUTO DIFF WBC: CPT

## 2021-08-24 PROCEDURE — 99215 OFFICE O/P EST HI 40 MIN: CPT | Performed by: INTERNAL MEDICINE

## 2021-08-24 PROCEDURE — 82728 ASSAY OF FERRITIN: CPT

## 2021-08-24 PROCEDURE — 36415 COLL VENOUS BLD VENIPUNCTURE: CPT

## 2021-08-24 PROCEDURE — 83540 ASSAY OF IRON: CPT

## 2021-08-24 PROCEDURE — 84466 ASSAY OF TRANSFERRIN: CPT

## 2021-08-24 NOTE — PROGRESS NOTES
Subjective     CHIEF COMPLAINT:      Chief Complaint   Patient presents with   • Follow-up     swelling in feet       HISTORY OF PRESENT ILLNESS:     Kylie Bowie is a 56 y.o. female patient who returns today for follow up on her factor V Leiden mutation with history of DVT and PE. She is on Xarelto 20 mg daily. She is tolerating it without problem with bleeding or bruising.     Patient reports having pain in her knees and hips. She recently received an injection in the right knee which helped to some degree. She was seen by Ortho and surgery was recommended to the right knee. The left was considered to be a high risk procedure due to the residual DVTs.    The patient states that she is also considering having surgery in the future for her hips.     Patient reports intermittent pain in the lower abdomen. She also reports intermittent pain in the neck that radiates to the upper abdomen.       ROS:  Pertinent ROS is in the HPI.     Past medical, surgical, social and family history were reviewed.     MEDICATIONS:    Current Outpatient Medications:   •  albuterol (PROVENTIL HFA;VENTOLIN HFA) 108 (90 BASE) MCG/ACT inhaler, Inhale 2 puffs every 4 (four) hours as needed for wheezing., Disp: , Rfl:   •  atorvastatin (LIPITOR) 10 MG tablet, Take 20 mg by mouth Daily., Disp: , Rfl:   •  chlorthalidone (HYGROTON) 25 MG tablet, , Disp: , Rfl:   •  diclofenac (VOLTAREN) 1 % gel gel, APPLY 4 GRAMS TO THE AFFECTED AREA FOUR TIMES DAILY, Disp: , Rfl: 2  •  fluticasone (FLONASE) 50 MCG/ACT nasal spray, 2 sprays into the nostril(s) as directed by provider Daily. Administer 2 sprays in each nostril for each dose., Disp: 3 bottle, Rfl: 1  •  lisinopril (PRINIVIL,ZESTRIL) 20 MG tablet, Take 20 mg by mouth daily., Disp: , Rfl:   •  montelukast (SINGULAIR) 10 MG tablet, Take 1 tablet by mouth Every Night., Disp: 90 tablet, Rfl: 1  •  omeprazole (priLOSEC) 40 MG capsule, Take 1 capsule by mouth Daily., Disp: 90 capsule, Rfl: 3  •   "rivaroxaban (XARELTO) 20 MG tablet, Take 1 tablet by mouth Daily., Disp: 30 tablet, Rfl: 3  •  triamcinolone (KENALOG) 0.1 % ointment, Apply  topically to the appropriate area as directed 2 (Two) Times a Day., Disp: 30 g, Rfl: 1  •  ferrous sulfate 325 (65 FE) MG tablet, Take 1 tablet by mouth Daily With Breakfast., Disp: 30 tablet, Rfl: 11      Objective   VITAL SIGNS:     Vitals:    08/24/21 0841   BP: 126/89   Pulse: 83   Resp: 16   Temp: 98 °F (36.7 °C)   TempSrc: Oral   SpO2: 98%   Weight: 110 kg (243 lb 6.4 oz)   Height: 170.2 cm (67.01\")   PainSc: 0-No pain     Body mass index is 38.11 kg/m².     Wt Readings from Last 5 Encounters:   08/24/21 110 kg (243 lb 6.4 oz)   03/12/21 109 kg (239 lb 12.8 oz)   02/26/21 111 kg (245 lb 3.2 oz)   02/23/21 113 kg (248 lb 3.2 oz)   01/29/21 109 kg (240 lb 3.2 oz)       PHYSICAL EXAMINATION:   GENERAL: The patient appears in good general condition, not in acute distress.   SKIN: No ecchymosis.  EYES: No jaundice.  NECK: No masses.  LYMPHATICS: No cervical or supraclavicular lymphadenopathy.  CHEST: Normal respiratory effort. Lungs clear bilaterally. No added sounds.  CVS: Normal S1 and S2. No murmurs.  ABDOMEN: Soft. No tenderness. No Hepatomegaly. No Splenomegaly. No masses.  EXTREMITIES: Trace edema. No calf tenderness. No erythema or warmth.     DIAGNOSTIC DATA:     Results from last 7 days   Lab Units 08/24/21  0828   WBC 10*3/mm3 9.92   NEUTROS ABS 10*3/mm3 7.68*   HEMOGLOBIN g/dL 13.8   HEMATOCRIT % 41.1   PLATELETS 10*3/mm3 267         Lab 08/24/21  0828   IRON 86   IRON SATURATION 25   TIBC 347   TRANSFERRIN 248   FERRITIN 609.20*      Venous Doppler on 8/20/2021:  · Chronic left lower extremity deep vein thrombosis noted in the distal femoral, popliteal and gastrocnemius.  · All other left sided veins appeared normal.      CT abdomen pelvis on 6/18/2021:  1. A homogenously enhancing fibroids arising from the uterus.  2. Gastrointestinal and urinary tract structures " are within normal limits.  3. Mild diffuse hepatic steatosis.      Assessment/Plan   1.  Factor V Leiden heterozygous mutation.  · She has a history of left lower extremity DVT that developed after a myomectomy, D and C in 1999.   · She had a foot surgery and was placed on Xarelto 10 mg daily after the surgery.   · Patient was diagnosed with acute PE and left lower extremity DVT on 8/19/2020.  There was no right heart strain.  · Acute DVT was identified in the left femoral, popliteal, posterior tibial, peroneal and gastrocnemius/soleal veins.  · She was hospitalized at [facility] on 8/19/2020 and anticoagulated with Lovenox.    · VQ scan on 9/9/2020 showed no perfusion defects.    · Venous Doppler on 9/9/2020 showed the thrombosis to have become chronic.  · Based on recurrent thrombosis and the unprovoked acute DVT and PE in August 2020, lifelong anticoagulation was recommended.  · CT angiogram on 2/15/2021 revealed resolution of the pulmonary embolism.  · Venous doppler on 2/15/2021 revealed a chronic thrombus in the distal femoral and popliteal veins.  · Venous Doppler on 8/20/2021 revealed chronic thrombus in the distal femoral, popliteal and gastrocnemius veins.  · I recommended continuing anticoagulation with Xarelto.  · The patient reports that she has severe pain in the right knee and she need to have right knee replacement.  She also states that she may need to have surgery to the hips and shoulders.   · I recommended limiting future interruptions of anticoagulation to the minimum due to her being at increased risk for recurrent thrombosis while off anticoagulation.    *Vaginal bleeding secondary to uterine fibroids.    · Patient reported to have enlargement of the uterus to a 20 week gestation size.    · She was referred to gynecology oncology and surgery was recommended to be done ASAP.    · They recommended having an IVC filter placed in preparation for the surgery.  · Due to the timeframe from the  diagnosis of a PE and since the patient was symptomatic in September 2020, the recommendation was to do radiosurgery ~6 months from the time of diagnosis of the PE (February 2021).  · In February 2021, patient indicated that she did not want to have the surgery done.    · Patient is not having any vaginal bleeding. She reports occasional lower abdominal pain.     *Anemia secondary to iron deficiency in a patient with Beta thalassemia trait.   · She was placed on ferrous sulfate 325 mg a day on 4/25/2019.  She took the ferrous sulfate for 6 months.    · Hemoglobin was normal at 13.2 on 7/30/2020.    · Hemoglobin dropped to 11.7 on 9/22/2020.  · Patient was started on ferrous sulfate 325 mg daily on 9/22/2020.  · Hemoglobin remains low.   · Patient did not tolerate the ferrous sulfate with development of severe constipation.   · Patient was given IV Venofer x2 doses on 2/26/2021 and 3/12/2021.  · Hemoglobin improved to 13.8 today. Iron levels significantly improved following the IV iron infusion.     PLAN:    1.  Continue Xarelto 20 mg daily.  2.  She does not need additional IV iron at this point.  3.  Since she did not respond to oral iron, I asked her not to start back on it.  4.  If the patient decides to have the knee surgery done, I would recommend a referral to Vascular Surgery for placement of IVC filter.  5.  I would recommend holding Xarelto 2 days before surgery. The morning after surgery and if she achieved adequate hemostasis, I would recommend Lovenox 30 mg SQ every 12 hours while inpatient. After discharge, I would recommend starting back on Xarelto 20 mg daily.  6.  Follow up in 3 months with a CBC Ferritin and iron panel.      I spent 40 minutes caring for Kylie on this date of service. This time includes time spent by me in the following activities: preparing for the visit, reviewing tests, obtaining and/or reviewing a separately obtained history, performing a medically appropriate examination  and/or evaluation, counseling and educating the patient/family/caregiver, ordering medications, tests, or procedures, documenting information in the medical record, independently interpreting results and communicating that information with the patient/family/caregiver and care coordination     Merly Giang MD  08/24/21

## 2021-09-10 ENCOUNTER — TELEPHONE (OUTPATIENT)
Dept: ONCOLOGY | Facility: CLINIC | Age: 56
End: 2021-09-10

## 2021-09-10 NOTE — TELEPHONE ENCOUNTER
Caller: SAUL    Relationship: NURSE    Best call back number: [phone]    What form or medical record are you requesting: LAST OFFICE NOTE    Who is requesting this form or medical record from you: DR. ZULETA--HAVING SURGICAL PROCEDURE    If fax, what is the fax number: [phone]    Timeframe paperwork needed: SURGERY IS SCHEDULED FOR 9/21/21    Additional notes: CONTACT SAUL WITH ANY OTHER QUESTIONS

## 2021-11-19 ENCOUNTER — TELEPHONE (OUTPATIENT)
Dept: ONCOLOGY | Facility: CLINIC | Age: 56
End: 2021-11-19

## 2021-11-19 NOTE — TELEPHONE ENCOUNTER
Called pt about flying to [facility]. Reminded her to take her Xarelto on time everyday and make sure she gets up and walks a little while in flight. She v/u to all. Pt missed apt yesterday with Dr. Giang. Advised her I would send a message to scheduling to get her back on the schedule. She v/u.

## 2021-11-19 NOTE — TELEPHONE ENCOUNTER
Caller: KEDAR PEARL    Relationship: SELF    Best call back number: [phone]    What is the best time to reach you: ANY    Who are you requesting to speak with (clinical staff, provider,  specific staff member): CLINICAL STAFF    What was the call regarding: PT STATES SHE IS FLYING TO [place] ON Tuesday, AND SHE WAS CALLING TO SEE IF IT IS SAFE FOR HER FLY AS SHE HAS BLOOD CLOTS. PLEASE CALL BACK WHEN POSSIBLE    Do you require a callback: YES

## 2021-12-13 ENCOUNTER — OFFICE VISIT (OUTPATIENT)
Dept: ONCOLOGY | Facility: CLINIC | Age: 56
End: 2021-12-13

## 2021-12-13 ENCOUNTER — LAB (OUTPATIENT)
Dept: LAB | Facility: HOSPITAL | Age: 56
End: 2021-12-13

## 2021-12-13 VITALS
OXYGEN SATURATION: 99 % | BODY MASS INDEX: 38 KG/M2 | SYSTOLIC BLOOD PRESSURE: 118 MMHG | WEIGHT: 242.1 LBS | DIASTOLIC BLOOD PRESSURE: 81 MMHG | RESPIRATION RATE: 18 BRPM | HEART RATE: 74 BPM | HEIGHT: 67 IN | TEMPERATURE: 96.9 F

## 2021-12-13 DIAGNOSIS — I82.512 CHRONIC DEEP VEIN THROMBOSIS (DVT) OF LEFT FEMORAL VEIN (HCC): ICD-10-CM

## 2021-12-13 DIAGNOSIS — D68.51 FACTOR V LEIDEN MUTATION (HCC): ICD-10-CM

## 2021-12-13 DIAGNOSIS — I82.512 CHRONIC DEEP VEIN THROMBOSIS (DVT) OF LEFT FEMORAL VEIN (HCC): Primary | ICD-10-CM

## 2021-12-13 DIAGNOSIS — I26.99 BILATERAL PULMONARY EMBOLISM (HCC): ICD-10-CM

## 2021-12-13 DIAGNOSIS — D50.0 IRON DEFICIENCY ANEMIA DUE TO CHRONIC BLOOD LOSS: ICD-10-CM

## 2021-12-13 LAB
BASOPHILS # BLD AUTO: 0.03 10*3/MM3 (ref 0–0.2)
BASOPHILS NFR BLD AUTO: 0.5 % (ref 0–1.5)
DEPRECATED RDW RBC AUTO: 40.4 FL (ref 37–54)
EOSINOPHIL # BLD AUTO: 0.04 10*3/MM3 (ref 0–0.4)
EOSINOPHIL NFR BLD AUTO: 0.7 % (ref 0.3–6.2)
ERYTHROCYTE [DISTWIDTH] IN BLOOD BY AUTOMATED COUNT: 13.1 % (ref 12.3–15.4)
FERRITIN SERPL-MCNC: 567.1 NG/ML (ref 11–207)
HCT VFR BLD AUTO: 40.3 % (ref 34–46.6)
HGB BLD-MCNC: 13.3 G/DL (ref 12–15.9)
IMM GRANULOCYTES # BLD AUTO: 0.02 10*3/MM3 (ref 0–0.05)
IMM GRANULOCYTES NFR BLD AUTO: 0.3 % (ref 0–0.5)
IRON 24H UR-MRATE: 85 MCG/DL (ref 37–145)
IRON SATN MFR SERPL: 25 % (ref 14–48)
LYMPHOCYTES # BLD AUTO: 1.85 10*3/MM3 (ref 0.7–3.1)
LYMPHOCYTES NFR BLD AUTO: 31.1 % (ref 19.6–45.3)
MCH RBC QN AUTO: 28 PG (ref 26.6–33)
MCHC RBC AUTO-ENTMCNC: 33 G/DL (ref 31.5–35.7)
MCV RBC AUTO: 84.8 FL (ref 79–97)
MONOCYTES # BLD AUTO: 0.48 10*3/MM3 (ref 0.1–0.9)
MONOCYTES NFR BLD AUTO: 8.1 % (ref 5–12)
NEUTROPHILS NFR BLD AUTO: 3.53 10*3/MM3 (ref 1.7–7)
NEUTROPHILS NFR BLD AUTO: 59.3 % (ref 42.7–76)
NRBC BLD AUTO-RTO: 0 /100 WBC (ref 0–0.2)
PLATELET # BLD AUTO: 238 10*3/MM3 (ref 140–450)
PMV BLD AUTO: 10 FL (ref 6–12)
RBC # BLD AUTO: 4.75 10*6/MM3 (ref 3.77–5.28)
TIBC SERPL-MCNC: 336 MCG/DL (ref 249–505)
TRANSFERRIN SERPL-MCNC: 240 MG/DL (ref 200–360)
WBC NRBC COR # BLD: 5.95 10*3/MM3 (ref 3.4–10.8)

## 2021-12-13 PROCEDURE — 84466 ASSAY OF TRANSFERRIN: CPT

## 2021-12-13 PROCEDURE — 83540 ASSAY OF IRON: CPT

## 2021-12-13 PROCEDURE — 99214 OFFICE O/P EST MOD 30 MIN: CPT | Performed by: INTERNAL MEDICINE

## 2021-12-13 PROCEDURE — 82728 ASSAY OF FERRITIN: CPT

## 2021-12-13 PROCEDURE — 85025 COMPLETE CBC W/AUTO DIFF WBC: CPT

## 2021-12-13 PROCEDURE — 36415 COLL VENOUS BLD VENIPUNCTURE: CPT

## 2021-12-13 NOTE — PROGRESS NOTES
Subjective     CHIEF COMPLAINT:      Chief Complaint   Patient presents with   • Follow-up     swelling left ankle       HISTORY OF PRESENT ILLNESS:     Kylie Bowie is a 56 y.o. female patient who returns today for follow up on her deep vein thrombosis. She is on chronic anticoagulation with Xarelto 20 mg daily. She is tolerating it but reports intermittent episodes of bruising. She reports intermittent swelling of the left ankle. She did not notice new skin changes. She reports occasional left leg pain.     ROS:  Pertinent ROS is in the HPI.     Past medical, surgical, social and family history were reviewed.     MEDICATIONS:    Current Outpatient Medications:   •  atorvastatin (LIPITOR) 10 MG tablet, Take 20 mg by mouth Daily., Disp: , Rfl:   •  chlorthalidone (HYGROTON) 25 MG tablet, , Disp: , Rfl:   •  diclofenac (VOLTAREN) 1 % gel gel, APPLY 4 GRAMS TO THE AFFECTED AREA FOUR TIMES DAILY, Disp: , Rfl: 2  •  fluticasone (FLONASE) 50 MCG/ACT nasal spray, 2 sprays into the nostril(s) as directed by provider Daily. Administer 2 sprays in each nostril for each dose., Disp: 3 bottle, Rfl: 1  •  lisinopril (PRINIVIL,ZESTRIL) 20 MG tablet, Take 20 mg by mouth daily., Disp: , Rfl:   •  montelukast (SINGULAIR) 10 MG tablet, Take 1 tablet by mouth Every Night., Disp: 90 tablet, Rfl: 1  •  omeprazole (priLOSEC) 40 MG capsule, Take 1 capsule by mouth Daily., Disp: 90 capsule, Rfl: 3  •  rivaroxaban (XARELTO) 20 MG tablet, Take 1 tablet by mouth Daily., Disp: 30 tablet, Rfl: 3  •  triamcinolone (KENALOG) 0.1 % ointment, Apply  topically to the appropriate area as directed 2 (Two) Times a Day., Disp: 30 g, Rfl: 1  •  albuterol (PROVENTIL HFA;VENTOLIN HFA) 108 (90 BASE) MCG/ACT inhaler, Inhale 2 puffs every 4 (four) hours as needed for wheezing., Disp: , Rfl:     Objective   VITAL SIGNS:     Vitals:    12/13/21 1339   BP: 118/81   Pulse: 74   Resp: 18   Temp: 96.9 °F (36.1 °C)   TempSrc: Temporal   SpO2: 99%   Weight: 110 kg  "(242 lb 1.6 oz)   Height: 170.2 cm (67.01\")   PainSc:   6     Body mass index is 37.91 kg/m².     Wt Readings from Last 5 Encounters:   12/13/21 110 kg (242 lb 1.6 oz)   08/24/21 110 kg (243 lb 6.4 oz)   03/12/21 109 kg (239 lb 12.8 oz)   02/26/21 111 kg (245 lb 3.2 oz)   02/23/21 113 kg (248 lb 3.2 oz)       PHYSICAL EXAMINATION:   GENERAL: The patient appears in good general condition, not in acute distress.   SKIN: No ecchymosis.  EYES: No jaundice.  CHEST: Normal respiratory effort.   CVS: trace left ankle edema.  ABDOMEN: Non-distended.  EXTREMITIES: left leg is larger than the right. No erythema or warmth. Mild left calf tenderness.     DIAGNOSTIC DATA:     Results from last 7 days   Lab Units 12/13/21  1319   WBC 10*3/mm3 5.95   NEUTROS ABS 10*3/mm3 3.53   HEMOGLOBIN g/dL 13.3   HEMATOCRIT % 40.3   PLATELETS 10*3/mm3 238         Lab 12/13/21  1319   IRON 85   IRON SATURATION 25   TIBC 336   TRANSFERRIN 240   FERRITIN 567.10*        Assessment/Plan   *Factor V Leiden heterozygous mutation.  · She has a history of left lower extremity DVT that developed after a myomectomy, D and C in 1999.   · She had a foot surgery and was placed on Xarelto 10 mg daily after the surgery.   · Patient was diagnosed with acute PE and left lower extremity DVT on 8/19/2020.  There was no right heart strain.  · Acute DVT was identified in the left femoral, popliteal, posterior tibial, peroneal and gastrocnemius/soleal veins.  · She was hospitalized at [facility] on 8/19/2020 and anticoagulated with Lovenox.    · VQ scan on 9/9/2020 showed no perfusion defects.    · Venous Doppler on 9/9/2020 showed the thrombosis to have become chronic.  · Based on recurrent thrombosis and the unprovoked acute DVT and PE in August 2020, lifelong anticoagulation was recommended.  · CT angiogram on 2/15/2021 revealed resolution of the pulmonary embolism.  · Venous doppler on 2/15/2021 revealed a chronic thrombus in the distal femoral and " popliteal veins.  · Venous Doppler on 8/20/2021 revealed chronic thrombus in the distal femoral, popliteal and gastrocnemius veins.  · Bridging with Lovenox 30 mg every 12 hours immediately after surgery was recommended if she requires future orthopedic surgeries.  · Due to her increased risk for postoperative thrombosis, her Orthopedic surgeon did not recommend surgery.  · Patient has mild swelling in the left leg. No erythema or warmth. There is mild calf tenderness.   · Since she does not have typical symptoms of acute DVT and since she has been compliant with her anticoagulation, I did not recommend obtaining a new venous doppler at this point.     *Vaginal bleeding secondary to uterine fibroids.    · Patient reported to have enlargement of the uterus to a 20 week gestation size.    · She was referred to gynecology oncology and surgery was recommended to be done ASAP.    · They recommended having an IVC filter placed in preparation for the surgery.  · Due to the timeframe from the diagnosis of a PE and since the patient was symptomatic in September 2020, the recommendation was to do radiosurgery ~6 months from the time of diagnosis of the PE (February 2021).  · Patient decided in February 2021 that she did not want to have the surgery.  · Patient is not having any bleeding.    *Anemia secondary to iron deficiency in a patient with Beta thalassemia trait.   · She was placed on ferrous sulfate 325 mg a day on 4/25/2019.  She took the ferrous sulfate for 6 months.    · Hemoglobin was normal at 13.2 on 7/30/2020.    · Hemoglobin dropped to 11.7 on 9/22/2020.  · Patient was started on ferrous sulfate 325 mg daily on 9/22/2020.  · Hemoglobin remains low.   · Patient did not tolerate the ferrous sulfate with development of severe constipation.   · Patient was given IV Venofer x2 doses on 2/26/2021 and 3/12/2021.  · Hemoglobin improved to 13.8 on 8/24/2021.  · Hemoglobin is 13.3 today.  · Iron stores are currently  adequate with a ferritin of 567 and a transferrin saturation of 25%.    PLAN:    1.  Continue Xarelto 20 mg daily.  2.  I recommended using the compression stocking with prolonged standing or sitting.   3.  I recommended avoiding having surgical interventions unless they are absolutely necessary to avoid repeated interruptions of her anticoagulation.   4.  She will return in 3 months for a CBC ferritin and iron panel with RN review. I will see her in follow up in 6 months with a CBC ferritin and iron panel. She will contact us sooner if there area any changes.         Merly Giang MD  12/13/21

## 2022-01-18 ENCOUNTER — TRANSCRIBE ORDERS (OUTPATIENT)
Dept: PHYSICAL THERAPY | Facility: CLINIC | Age: 57
End: 2022-01-18

## 2022-01-18 DIAGNOSIS — G56.03 CARPAL TUNNEL SYNDROME, BILATERAL: Primary | ICD-10-CM

## 2022-02-01 ENCOUNTER — TREATMENT (OUTPATIENT)
Dept: PHYSICAL THERAPY | Facility: CLINIC | Age: 57
End: 2022-02-01

## 2022-02-01 DIAGNOSIS — G56.03 BILATERAL CARPAL TUNNEL SYNDROME: Primary | ICD-10-CM

## 2022-02-01 PROCEDURE — 97750 PHYSICAL PERFORMANCE TEST: CPT | Performed by: PHYSICAL THERAPIST

## 2022-03-07 ENCOUNTER — APPOINTMENT (OUTPATIENT)
Dept: ONCOLOGY | Facility: HOSPITAL | Age: 57
End: 2022-03-07

## 2022-03-07 ENCOUNTER — APPOINTMENT (OUTPATIENT)
Dept: LAB | Facility: HOSPITAL | Age: 57
End: 2022-03-07

## 2022-03-14 DIAGNOSIS — I26.99 BILATERAL PULMONARY EMBOLISM: ICD-10-CM

## 2022-03-14 RX ORDER — RIVAROXABAN 20 MG/1
TABLET, FILM COATED ORAL
Qty: 30 TABLET | Refills: 0 | Status: SHIPPED | OUTPATIENT
Start: 2022-03-14 | End: 2022-06-06 | Stop reason: SDUPTHER

## 2022-04-21 NOTE — TELEPHONE ENCOUNTER
Notified pt of iron labs and Dr Giang's recommendation to have two doses of injectafer. She v/u.  Message sent to appt desk to schedule   q7-8 mins

## 2022-06-06 ENCOUNTER — LAB (OUTPATIENT)
Dept: LAB | Facility: HOSPITAL | Age: 57
End: 2022-06-06

## 2022-06-06 ENCOUNTER — OFFICE VISIT (OUTPATIENT)
Dept: ONCOLOGY | Facility: CLINIC | Age: 57
End: 2022-06-06

## 2022-06-06 VITALS
WEIGHT: 240.1 LBS | RESPIRATION RATE: 16 BRPM | HEIGHT: 67 IN | OXYGEN SATURATION: 98 % | BODY MASS INDEX: 37.69 KG/M2 | HEART RATE: 79 BPM | TEMPERATURE: 97.1 F | DIASTOLIC BLOOD PRESSURE: 91 MMHG | SYSTOLIC BLOOD PRESSURE: 134 MMHG

## 2022-06-06 DIAGNOSIS — D68.51 FACTOR V LEIDEN MUTATION: ICD-10-CM

## 2022-06-06 DIAGNOSIS — D50.0 IRON DEFICIENCY ANEMIA DUE TO CHRONIC BLOOD LOSS: ICD-10-CM

## 2022-06-06 DIAGNOSIS — I26.99 BILATERAL PULMONARY EMBOLISM: ICD-10-CM

## 2022-06-06 DIAGNOSIS — I82.512 CHRONIC DEEP VEIN THROMBOSIS (DVT) OF LEFT FEMORAL VEIN: Primary | ICD-10-CM

## 2022-06-06 DIAGNOSIS — N28.9 FUNCTION KIDNEY DECREASED: ICD-10-CM

## 2022-06-06 LAB
BASOPHILS # BLD AUTO: 0.04 10*3/MM3 (ref 0–0.2)
BASOPHILS NFR BLD AUTO: 0.6 % (ref 0–1.5)
DEPRECATED RDW RBC AUTO: 43.8 FL (ref 37–54)
EOSINOPHIL # BLD AUTO: 0.05 10*3/MM3 (ref 0–0.4)
EOSINOPHIL NFR BLD AUTO: 0.7 % (ref 0.3–6.2)
ERYTHROCYTE [DISTWIDTH] IN BLOOD BY AUTOMATED COUNT: 14.1 % (ref 12.3–15.4)
FERRITIN SERPL-MCNC: 386.2 NG/ML (ref 11–207)
HCT VFR BLD AUTO: 38.6 % (ref 34–46.6)
HGB BLD-MCNC: 12.6 G/DL (ref 12–15.9)
IMM GRANULOCYTES # BLD AUTO: 0.02 10*3/MM3 (ref 0–0.05)
IMM GRANULOCYTES NFR BLD AUTO: 0.3 % (ref 0–0.5)
IRON 24H UR-MRATE: 67 MCG/DL (ref 37–145)
IRON SATN MFR SERPL: 19 % (ref 14–48)
LYMPHOCYTES # BLD AUTO: 1.74 10*3/MM3 (ref 0.7–3.1)
LYMPHOCYTES NFR BLD AUTO: 25.5 % (ref 19.6–45.3)
MCH RBC QN AUTO: 28 PG (ref 26.6–33)
MCHC RBC AUTO-ENTMCNC: 32.6 G/DL (ref 31.5–35.7)
MCV RBC AUTO: 85.8 FL (ref 79–97)
MONOCYTES # BLD AUTO: 0.61 10*3/MM3 (ref 0.1–0.9)
MONOCYTES NFR BLD AUTO: 8.9 % (ref 5–12)
NEUTROPHILS NFR BLD AUTO: 4.36 10*3/MM3 (ref 1.7–7)
NEUTROPHILS NFR BLD AUTO: 64 % (ref 42.7–76)
NRBC BLD AUTO-RTO: 0 /100 WBC (ref 0–0.2)
PLATELET # BLD AUTO: 214 10*3/MM3 (ref 140–450)
PMV BLD AUTO: 10.1 FL (ref 6–12)
RBC # BLD AUTO: 4.5 10*6/MM3 (ref 3.77–5.28)
TIBC SERPL-MCNC: 350 MCG/DL (ref 249–505)
TRANSFERRIN SERPL-MCNC: 250 MG/DL (ref 200–360)
WBC NRBC COR # BLD: 6.82 10*3/MM3 (ref 3.4–10.8)

## 2022-06-06 PROCEDURE — 83540 ASSAY OF IRON: CPT

## 2022-06-06 PROCEDURE — 82728 ASSAY OF FERRITIN: CPT

## 2022-06-06 PROCEDURE — 85025 COMPLETE CBC W/AUTO DIFF WBC: CPT

## 2022-06-06 PROCEDURE — 99214 OFFICE O/P EST MOD 30 MIN: CPT | Performed by: INTERNAL MEDICINE

## 2022-06-06 PROCEDURE — 36415 COLL VENOUS BLD VENIPUNCTURE: CPT

## 2022-06-06 PROCEDURE — 84466 ASSAY OF TRANSFERRIN: CPT

## 2022-06-06 RX ORDER — HYDROCODONE BITARTRATE AND ACETAMINOPHEN 5; 325 MG/1; MG/1
TABLET ORAL
COMMUNITY
Start: 2022-05-25

## 2022-06-06 RX ORDER — CLINDAMYCIN HYDROCHLORIDE 150 MG/1
CAPSULE ORAL
COMMUNITY
Start: 2022-02-10 | End: 2023-02-20

## 2022-06-06 NOTE — PROGRESS NOTES
Subjective     CHIEF COMPLAINT:      Chief Complaint   Patient presents with   • Follow-up     NO CONCERNS       HISTORY OF PRESENT ILLNESS:     Kylie Bowie is a 57 y.o. female patient who returns today for follow up on her factor V Leiden mutation and prior history of venous thromboembolism.  She is on Xarelto 20 mg daily.  Due to difficulty with pharmacy, she ran out of Xarelto for 1 week recently as she was waiting for the medicine to arrive.    Patient reports swelling in the legs, more on the left.  She reports intermittent pain in the shins, left more than right.  There is occasional pain in the calf.  No redness or warmth.  No chest pain or shortness of breath.    Patient is no longer having vaginal bleeding.  However, she reports having intermittent lower abdominal pain.  She did not have a recent follow-up with her GYN Oncologist.    Patient reports having intermittent pain in her joints.  She has multiple joint injections which have helped with her pain.    ROS:  Pertinent ROS is in the HPI.     Past medical, surgical, social and family history were reviewed.     MEDICATIONS:    Current Outpatient Medications:   •  albuterol (PROVENTIL HFA;VENTOLIN HFA) 108 (90 BASE) MCG/ACT inhaler, Inhale 2 puffs every 4 (four) hours as needed for wheezing., Disp: , Rfl:   •  atorvastatin (LIPITOR) 10 MG tablet, Take 20 mg by mouth Daily., Disp: , Rfl:   •  chlorthalidone (HYGROTON) 25 MG tablet, , Disp: , Rfl:   •  clindamycin (CLEOCIN) 150 MG capsule, , Disp: , Rfl:   •  diclofenac (VOLTAREN) 1 % gel gel, APPLY 4 GRAMS TO THE AFFECTED AREA FOUR TIMES DAILY, Disp: , Rfl: 2  •  fluticasone (FLONASE) 50 MCG/ACT nasal spray, 2 sprays into the nostril(s) as directed by provider Daily. Administer 2 sprays in each nostril for each dose., Disp: 3 bottle, Rfl: 1  •  HYDROcodone-acetaminophen (NORCO) 5-325 MG per tablet, , Disp: , Rfl:   •  lisinopril (PRINIVIL,ZESTRIL) 20 MG tablet, Take 20 mg by mouth daily., Disp: , Rfl:  "  •  montelukast (SINGULAIR) 10 MG tablet, Take 1 tablet by mouth Every Night., Disp: 90 tablet, Rfl: 1  •  omeprazole (priLOSEC) 40 MG capsule, Take 1 capsule by mouth Daily., Disp: 90 capsule, Rfl: 3  •  Xarelto 20 MG tablet, Take 1 tablet by mouth once daily, Disp: 30 tablet, Rfl: 0  •  triamcinolone (KENALOG) 0.1 % ointment, Apply  topically to the appropriate area as directed 2 (Two) Times a Day., Disp: 30 g, Rfl: 1  Objective     VITAL SIGNS:     Vitals:    06/06/22 1441   BP: 134/91   Pulse: 79   Resp: 16   Temp: 97.1 °F (36.2 °C)   TempSrc: Temporal   SpO2: 98%   Weight: 109 kg (240 lb 1.6 oz)   Height: 170.2 cm (67.01\")   PainSc:   5   PainLoc: Generalized     Body mass index is 37.6 kg/m².     Wt Readings from Last 5 Encounters:   06/06/22 109 kg (240 lb 1.6 oz)   12/13/21 110 kg (242 lb 1.6 oz)   08/24/21 110 kg (243 lb 6.4 oz)   03/12/21 109 kg (239 lb 12.8 oz)   02/26/21 111 kg (245 lb 3.2 oz)     PHYSICAL EXAMINATION:   GENERAL: The patient appears in good general condition, not in acute distress.   SKIN: No ecchymosis.  EYES: No jaundice. No pallor.  LYMPHATICS: No cervical lymphadenopathy.  CHEST: Normal respiratory effort.  Lungs clear bilaterally.  No added sounds.  CVS: Normal S1-S2.  No murmurs  ABDOMEN: Soft.  Mild hypogastric tenderness. No Hepatomegaly. No Splenomegaly. No masses.  EXTREMITIES: +1 edema bilaterally.  No calf tenderness.  No leg erythema or warmth.    DIAGNOSTIC DATA:     Results from last 7 days   Lab Units 06/06/22  1424   WBC 10*3/mm3 6.82   NEUTROS ABS 10*3/mm3 4.36   HEMOGLOBIN g/dL 12.6   HEMATOCRIT % 38.6   PLATELETS 10*3/mm3 214         Lab 06/06/22  1424   IRON 67   IRON SATURATION 19   TIBC 350   TRANSFERRIN 250   FERRITIN 386.20*      Lab on 4/29/2022:  CREATININE   0.50 - 1.05 mg/dL 1.18 High         Assessment & Plan    *Factor V Leiden heterozygous mutation.  · She has a history of left lower extremity DVT that developed after a myomectomy, D&C in 1999.   · She " had a foot surgery and was placed on Xarelto 10 mg daily after the surgery.   · She was diagnosed with acute PE and left lower extremity DVT on 8/19/2020.  There was no right heart strain.  · Acute DVT was identified in the left femoral, popliteal, posterior tibial, peroneal and gastrocnemius/soleal veins.  · She was hospitalized at [facility] on 8/19/2020 and anticoagulated with Lovenox.    · VQ scan on 9/9/2020 showed no perfusion defects.    · Venous Doppler on 9/9/2020 showed the thrombosis to have become chronic.  · Based on recurrent thrombosis and the unprovoked acute DVT and PE in August 2020, lifelong anticoagulation was recommended.  · CT angiogram on 2/15/2021 revealed resolution of the pulmonary embolism.  · Venous doppler on 2/15/2021 revealed a chronic thrombus in the distal femoral and popliteal veins.  · Venous Doppler on 8/20/2021 revealed chronic thrombus in the distal femoral, popliteal and gastrocnemius veins.  · Bridging with Lovenox 30 mg every 12 hours immediately after surgery was recommended if she requires future orthopedic surgeries.  · Due to her increased risk for postoperative thrombosis, her Orthopedic surgeon did not recommend surgery.  · Patient is continuing on Xarelto 20 mg daily.  She is tolerating it without problem with bleeding.  · She has bilateral leg swelling.  Otherwise, no symptoms or signs of recurrent deep vein thrombosis.  · I recommended continuing Xarelto long-term and avoiding interruptions of her anticoagulation unless they are absolutely medically necessary.    *Vaginal bleeding secondary to uterine fibroids.    · Patient reported to have enlargement of the uterus to a 20 week gestation size.    · She was referred to gynecology oncology and surgery was recommended to be done ASAP.    · They recommended having an IVC filter placed in preparation for the surgery.  · Due to the timeframe from the diagnosis of a PE and since the patient was symptomatic in  September 2020, the recommendation was to do radiosurgery ~6 months from the time of diagnosis of the PE (February 2021).  · Patient decided in February 2021 that she did not want to have the surgery.  · Patient is no longer having vaginal bleeding.  However, she reports intermittent lower abdominal pain.    *Anemia secondary to iron deficiency in a patient with Beta thalassemia trait.   · She was placed on ferrous sulfate 325 mg a day on 4/25/2019.  She took the ferrous sulfate for 6 months.    · Hemoglobin was normal at 13.2 on 7/30/2020.    · Hemoglobin dropped to 11.7 on 9/22/2020.  · Patient was started on ferrous sulfate 325 mg daily on 9/22/2020.  · She did not respond to tolerate the oral iron which was eventually discontinued.  · Patient was given IV Venofer x2 doses on 2/26/2021 and 3/12/2021.  · Hemoglobin improved to 13.8 on 8/24/2021.  · Hemoglobin was 13.3 on 12/13/2021 with a transferrin saturation of 25% and ferritin of 567.  · Hemoglobin is 12.6 today.  Iron stores are adequate with a ferritin of 286 and transferrin saturation of 19%.    *Decreased kidney function.  She had a creatinine of 1.18 on 4/29/2022.  We discussed the need to maintain adequate hydration and avoid NSAIDs (already doing).     PLAN:    1.  Continue Xarelto 20 mg daily.   2.  I recommended follow-up with GYN oncology for evaluation of the status of the uterine fibroids.    3.  Patient does not need IV iron at this point.  4.  Follow-up in 6 months with CBC ferritin iron panel.        Merly Giang MD  06/06/22

## 2022-11-09 ENCOUNTER — APPOINTMENT (OUTPATIENT)
Dept: CARDIOLOGY | Facility: HOSPITAL | Age: 57
End: 2022-11-09

## 2022-11-14 ENCOUNTER — APPOINTMENT (OUTPATIENT)
Dept: LAB | Facility: HOSPITAL | Age: 57
End: 2022-11-14

## 2022-11-15 ENCOUNTER — DOCUMENTATION (OUTPATIENT)
Dept: GASTROENTEROLOGY | Facility: CLINIC | Age: 57
End: 2022-11-15

## 2022-11-15 ENCOUNTER — TELEPHONE (OUTPATIENT)
Dept: ONCOLOGY | Facility: CLINIC | Age: 57
End: 2022-11-15

## 2022-11-15 NOTE — TELEPHONE ENCOUNTER
----- Message from Cherelle Leal RN sent at 11/10/2022  8:19 AM EST -----  Regarding: FW: did not have doppler appt 11/14  Patient did not have doppler done. Please r/s her doppler and then her follow up a week from that.     Thanks!    ----- Message -----  From: Pamela Stallworth  Sent: 11/9/2022   3:32 PM EST  To: Cherelle Leal RN  Subject: did not have doppler appt 11/14

## 2023-01-16 ENCOUNTER — HOSPITAL ENCOUNTER (OUTPATIENT)
Dept: CARDIOLOGY | Facility: HOSPITAL | Age: 58
Discharge: HOME | End: 2023-01-16
Admitting: INTERNAL MEDICINE
Payer: COMMERCIAL

## 2023-01-16 DIAGNOSIS — I82.512 CHRONIC DEEP VEIN THROMBOSIS (DVT) OF LEFT FEMORAL VEIN: ICD-10-CM

## 2023-01-16 DIAGNOSIS — D68.51 FACTOR V LEIDEN MUTATION: ICD-10-CM

## 2023-01-16 LAB
BH CV LOW VAS LEFT DISTAL FEMORAL SPONT: 1
BH CV LOW VAS LEFT POPLITEAL SPONT: 1
BH CV LOWER VASCULAR LEFT COMMON FEMORAL AUGMENT: NORMAL
BH CV LOWER VASCULAR LEFT COMMON FEMORAL COMPETENT: NORMAL
BH CV LOWER VASCULAR LEFT COMMON FEMORAL COMPRESS: NORMAL
BH CV LOWER VASCULAR LEFT COMMON FEMORAL PHASIC: NORMAL
BH CV LOWER VASCULAR LEFT COMMON FEMORAL SPONT: NORMAL
BH CV LOWER VASCULAR LEFT DISTAL FEMORAL COMPRESS: NORMAL
BH CV LOWER VASCULAR LEFT DISTAL FEMORAL THROMBUS: NORMAL
BH CV LOWER VASCULAR LEFT GASTRONEMIUS COMPRESS: NORMAL
BH CV LOWER VASCULAR LEFT GREATER SAPH AK COMPRESS: NORMAL
BH CV LOWER VASCULAR LEFT GREATER SAPH BK COMPRESS: NORMAL
BH CV LOWER VASCULAR LEFT LESSER SAPH COMPRESS: NORMAL
BH CV LOWER VASCULAR LEFT MID FEMORAL AUGMENT: NORMAL
BH CV LOWER VASCULAR LEFT MID FEMORAL COMPETENT: NORMAL
BH CV LOWER VASCULAR LEFT MID FEMORAL COMPRESS: NORMAL
BH CV LOWER VASCULAR LEFT MID FEMORAL PHASIC: NORMAL
BH CV LOWER VASCULAR LEFT MID FEMORAL SPONT: NORMAL
BH CV LOWER VASCULAR LEFT PERONEAL COMPRESS: NORMAL
BH CV LOWER VASCULAR LEFT POPLITEAL AUGMENT: NORMAL
BH CV LOWER VASCULAR LEFT POPLITEAL COMPETENT: NORMAL
BH CV LOWER VASCULAR LEFT POPLITEAL COMPRESS: NORMAL
BH CV LOWER VASCULAR LEFT POPLITEAL PHASIC: NORMAL
BH CV LOWER VASCULAR LEFT POPLITEAL SPONT: NORMAL
BH CV LOWER VASCULAR LEFT POPLITEAL THROMBUS: NORMAL
BH CV LOWER VASCULAR LEFT POSTERIOR TIBIAL COMPRESS: NORMAL
BH CV LOWER VASCULAR LEFT PROFUNDA FEMORAL COMPRESS: NORMAL
BH CV LOWER VASCULAR LEFT PROXIMAL FEMORAL COMPRESS: NORMAL
BH CV LOWER VASCULAR LEFT SAPHENOFEMORAL JUNCTION COMPRESS: NORMAL
BH CV LOWER VASCULAR RIGHT COMMON FEMORAL AUGMENT: NORMAL
BH CV LOWER VASCULAR RIGHT COMMON FEMORAL COMPETENT: NORMAL
BH CV LOWER VASCULAR RIGHT COMMON FEMORAL COMPRESS: NORMAL
BH CV LOWER VASCULAR RIGHT COMMON FEMORAL PHASIC: NORMAL
BH CV LOWER VASCULAR RIGHT COMMON FEMORAL SPONT: NORMAL
MAXIMAL PREDICTED HEART RATE: 164 BPM
STRESS TARGET HR: 139 BPM

## 2023-01-16 PROCEDURE — 93971 EXTREMITY STUDY: CPT

## 2023-01-24 ENCOUNTER — LAB (OUTPATIENT)
Dept: LAB | Facility: HOSPITAL | Age: 58
End: 2023-01-24
Payer: COMMERCIAL

## 2023-01-24 ENCOUNTER — OFFICE VISIT (OUTPATIENT)
Dept: ONCOLOGY | Facility: CLINIC | Age: 58
End: 2023-01-24
Payer: COMMERCIAL

## 2023-01-24 VITALS
DIASTOLIC BLOOD PRESSURE: 84 MMHG | HEIGHT: 67 IN | TEMPERATURE: 97.1 F | HEART RATE: 83 BPM | OXYGEN SATURATION: 98 % | SYSTOLIC BLOOD PRESSURE: 121 MMHG | WEIGHT: 232.7 LBS | RESPIRATION RATE: 16 BRPM | BODY MASS INDEX: 36.52 KG/M2

## 2023-01-24 DIAGNOSIS — D50.0 IRON DEFICIENCY ANEMIA DUE TO CHRONIC BLOOD LOSS: ICD-10-CM

## 2023-01-24 DIAGNOSIS — R79.89 ELEVATED SERUM CREATININE: ICD-10-CM

## 2023-01-24 DIAGNOSIS — I82.512 CHRONIC DEEP VEIN THROMBOSIS (DVT) OF LEFT FEMORAL VEIN: ICD-10-CM

## 2023-01-24 DIAGNOSIS — I26.99 BILATERAL PULMONARY EMBOLISM: ICD-10-CM

## 2023-01-24 DIAGNOSIS — D68.51 FACTOR V LEIDEN MUTATION: ICD-10-CM

## 2023-01-24 DIAGNOSIS — D68.51 FACTOR V LEIDEN MUTATION: Primary | ICD-10-CM

## 2023-01-24 LAB
ALBUMIN SERPL-MCNC: 4.2 G/DL (ref 3.5–5.2)
ALBUMIN/GLOB SERPL: 1.1 G/DL (ref 1.1–2.4)
ALP SERPL-CCNC: 84 U/L (ref 38–116)
ALT SERPL W P-5'-P-CCNC: 19 U/L (ref 0–33)
ANION GAP SERPL CALCULATED.3IONS-SCNC: 11 MMOL/L (ref 5–15)
AST SERPL-CCNC: 20 U/L (ref 0–32)
BASOPHILS # BLD AUTO: 0.05 10*3/MM3 (ref 0–0.2)
BASOPHILS NFR BLD AUTO: 0.9 % (ref 0–1.5)
BILIRUB SERPL-MCNC: 0.5 MG/DL (ref 0.2–1.2)
BUN SERPL-MCNC: 15 MG/DL (ref 6–20)
BUN/CREAT SERPL: 13.6 (ref 7.3–30)
CALCIUM SPEC-SCNC: 9.6 MG/DL (ref 8.5–10.2)
CHLORIDE SERPL-SCNC: 100 MMOL/L (ref 98–107)
CO2 SERPL-SCNC: 29 MMOL/L (ref 22–29)
CREAT SERPL-MCNC: 1.1 MG/DL (ref 0.6–1.1)
DEPRECATED RDW RBC AUTO: 42.4 FL (ref 37–54)
EGFRCR SERPLBLD CKD-EPI 2021: 58.7 ML/MIN/1.73
EOSINOPHIL # BLD AUTO: 0.07 10*3/MM3 (ref 0–0.4)
EOSINOPHIL NFR BLD AUTO: 1.2 % (ref 0.3–6.2)
ERYTHROCYTE [DISTWIDTH] IN BLOOD BY AUTOMATED COUNT: 13.7 % (ref 12.3–15.4)
FERRITIN SERPL-MCNC: 485.9 NG/ML (ref 11–207)
GLOBULIN UR ELPH-MCNC: 3.7 GM/DL (ref 1.8–3.5)
GLUCOSE SERPL-MCNC: 111 MG/DL (ref 74–124)
HCT VFR BLD AUTO: 42.1 % (ref 34–46.6)
HGB BLD-MCNC: 14.1 G/DL (ref 12–15.9)
IMM GRANULOCYTES # BLD AUTO: 0.01 10*3/MM3 (ref 0–0.05)
IMM GRANULOCYTES NFR BLD AUTO: 0.2 % (ref 0–0.5)
IRON 24H UR-MRATE: 133 MCG/DL (ref 37–145)
IRON SATN MFR SERPL: 34 % (ref 14–48)
LYMPHOCYTES # BLD AUTO: 1.8 10*3/MM3 (ref 0.7–3.1)
LYMPHOCYTES NFR BLD AUTO: 31.4 % (ref 19.6–45.3)
MCH RBC QN AUTO: 28.3 PG (ref 26.6–33)
MCHC RBC AUTO-ENTMCNC: 33.5 G/DL (ref 31.5–35.7)
MCV RBC AUTO: 84.5 FL (ref 79–97)
MONOCYTES # BLD AUTO: 0.47 10*3/MM3 (ref 0.1–0.9)
MONOCYTES NFR BLD AUTO: 8.2 % (ref 5–12)
NEUTROPHILS NFR BLD AUTO: 3.33 10*3/MM3 (ref 1.7–7)
NEUTROPHILS NFR BLD AUTO: 58.1 % (ref 42.7–76)
NRBC BLD AUTO-RTO: 0 /100 WBC (ref 0–0.2)
PLATELET # BLD AUTO: 264 10*3/MM3 (ref 140–450)
PMV BLD AUTO: 9.9 FL (ref 6–12)
POTASSIUM SERPL-SCNC: 3.6 MMOL/L (ref 3.5–4.7)
PROT SERPL-MCNC: 7.9 G/DL (ref 6.3–8)
RBC # BLD AUTO: 4.98 10*6/MM3 (ref 3.77–5.28)
SODIUM SERPL-SCNC: 140 MMOL/L (ref 134–145)
TIBC SERPL-MCNC: 388 MCG/DL (ref 249–505)
TRANSFERRIN SERPL-MCNC: 277 MG/DL (ref 200–360)
WBC NRBC COR # BLD: 5.73 10*3/MM3 (ref 3.4–10.8)

## 2023-01-24 PROCEDURE — 36415 COLL VENOUS BLD VENIPUNCTURE: CPT

## 2023-01-24 PROCEDURE — 80053 COMPREHEN METABOLIC PANEL: CPT | Performed by: INTERNAL MEDICINE

## 2023-01-24 PROCEDURE — 84466 ASSAY OF TRANSFERRIN: CPT

## 2023-01-24 PROCEDURE — 83540 ASSAY OF IRON: CPT

## 2023-01-24 PROCEDURE — 82728 ASSAY OF FERRITIN: CPT

## 2023-01-24 PROCEDURE — 85025 COMPLETE CBC W/AUTO DIFF WBC: CPT

## 2023-01-24 PROCEDURE — 99214 OFFICE O/P EST MOD 30 MIN: CPT | Performed by: INTERNAL MEDICINE

## 2023-01-24 NOTE — PROGRESS NOTES
Subjective     CHIEF COMPLAINT:      Chief Complaint   Patient presents with   • Follow-up     DISCUSS DOPPLER       HISTORY OF PRESENT ILLNESS:     Kylie Bowie is a 58 y.o. female patient who returns today for follow up on her left lower extremity DVT.  She returns today for follow-up reporting pain in the left thigh distal aspect.  She also has burning sensation in the dorsal aspect of the left foot.  She used to receive steroid and gel injections to her joints but she did not receive any injections recently.  She continues anticoagulation with Xarelto.  She is not having problem with bleeding.    Patient is no longer having vaginal bleeding.  She believes she is in menopause.  She reports having hot flashes.    ROS:  Pertinent ROS is in the HPI.     Past medical, surgical, social and family history were reviewed.     MEDICATIONS:    Current Outpatient Medications:   •  albuterol (PROVENTIL HFA;VENTOLIN HFA) 108 (90 BASE) MCG/ACT inhaler, Inhale 2 puffs every 4 (four) hours as needed for wheezing., Disp: , Rfl:   •  atorvastatin (LIPITOR) 10 MG tablet, Take 20 mg by mouth Daily., Disp: , Rfl:   •  chlorthalidone (HYGROTON) 25 MG tablet, , Disp: , Rfl:   •  clindamycin (CLEOCIN) 150 MG capsule, , Disp: , Rfl:   •  diclofenac (VOLTAREN) 1 % gel gel, APPLY 4 GRAMS TO THE AFFECTED AREA FOUR TIMES DAILY, Disp: , Rfl: 2  •  fluticasone (FLONASE) 50 MCG/ACT nasal spray, 2 sprays into the nostril(s) as directed by provider Daily. Administer 2 sprays in each nostril for each dose., Disp: 3 bottle, Rfl: 1  •  HYDROcodone-acetaminophen (NORCO) 5-325 MG per tablet, , Disp: , Rfl:   •  lisinopril (PRINIVIL,ZESTRIL) 20 MG tablet, Take 20 mg by mouth daily., Disp: , Rfl:   •  montelukast (SINGULAIR) 10 MG tablet, Take 1 tablet by mouth Every Night., Disp: 90 tablet, Rfl: 1  •  omeprazole (priLOSEC) 40 MG capsule, Take 1 capsule by mouth Daily., Disp: 90 capsule, Rfl: 3  •  rivaroxaban (Xarelto) 20 MG tablet, Take 1 tablet by  "mouth Daily., Disp: 90 tablet, Rfl: 1  •  triamcinolone (KENALOG) 0.1 % ointment, Apply  topically to the appropriate area as directed 2 (Two) Times a Day., Disp: 30 g, Rfl: 1  Objective     VITAL SIGNS:     Vitals:    01/24/23 1213   BP: 121/84   Pulse: 83   Resp: 16   Temp: 97.1 °F (36.2 °C)   TempSrc: Temporal   SpO2: 98%   Weight: 106 kg (232 lb 11.2 oz)   Height: 170.2 cm (67.01\")   PainSc:   7   PainLoc: Generalized     Body mass index is 36.44 kg/m².     Wt Readings from Last 5 Encounters:   01/24/23 106 kg (232 lb 11.2 oz)   06/06/22 109 kg (240 lb 1.6 oz)   12/13/21 110 kg (242 lb 1.6 oz)   08/24/21 110 kg (243 lb 6.4 oz)   03/12/21 109 kg (239 lb 12.8 oz)     PHYSICAL EXAMINATION:   GENERAL: The patient appears in good general condition, not in acute distress.   SKIN: No ecchymosis.  EYES: No jaundice. No pallor.  CHEST: Normal respiratory effort.  Lungs clear bilaterally.  No added sounds.  CVS: Normal S1-S2.  No murmurs.  ABDOMEN: Nondistended.  EXTREMITIES: Tenderness in the left thigh distal aspect.  No calf tenderness.  No evidence of phlebitis on exam of the left foot.    DIAGNOSTIC DATA:     Results from last 7 days   Lab Units 01/24/23  1205   WBC 10*3/mm3 5.73   NEUTROS ABS 10*3/mm3 3.33   HEMOGLOBIN g/dL 14.1   HEMATOCRIT % 42.1   PLATELETS 10*3/mm3 264     Results from last 7 days   Lab Units 01/24/23  1205   SODIUM mmol/L 140   POTASSIUM mmol/L 3.6   CHLORIDE mmol/L 100   CO2 mmol/L 29.0   BUN mg/dL 15   CREATININE mg/dL 1.10   CALCIUM mg/dL 9.6   ALBUMIN g/dL 4.2   BILIRUBIN mg/dL 0.5   ALK PHOS U/L 84   ALT (SGPT) U/L 19   AST (SGOT) U/L 20   GLUCOSE mg/dL 111         Lab 01/24/23  1205   IRON 133   IRON SATURATION 34   TIBC 388   TRANSFERRIN 277   FERRITIN 485.90*      Venous Doppler on 1/16/2023:  •  Chronic left lower extremity deep vein thrombosis noted in the distal femoral and popliteal.  •  All other left sided veins appeared normal.    Assessment & Plan    *Factor V Leiden " heterozygous mutation.  · She has a history of left lower extremity DVT that developed after a myomectomy, D&C in 1999.   · She had a foot surgery and was placed on Xarelto 10 mg daily after the surgery.   · She was diagnosed with acute PE and left lower extremity DVT on 8/19/2020.  There was no right heart strain.  · Acute DVT was identified in the left femoral, popliteal, posterior tibial, peroneal and gastrocnemius/soleal veins.  · She was hospitalized at [facility] on 8/19/2020 and anticoagulated with Lovenox.    · VQ scan on 9/9/2020 showed no perfusion defects.    · Venous Doppler on 9/9/2020 showed the thrombosis to have become chronic.  · Based on recurrent thrombosis and the unprovoked acute DVT and PE in August 2020, lifelong anticoagulation was recommended.  · CT angiogram on 2/15/2021 revealed resolution of the pulmonary embolism.  · Venous doppler on 2/15/2021 revealed a chronic thrombus in the distal femoral and popliteal veins.  · Venous Doppler on 8/20/2021 revealed chronic thrombus in the distal femoral, popliteal and gastrocnemius veins.  · Bridging with Lovenox 30 mg every 12 hours immediately after surgery was recommended if she requires future orthopedic surgeries.  · Due to her increased risk for postoperative thrombosis, her Orthopedic surgeon did not recommend surgery.  · Venous Doppler on 1/16/2023 revealed chronic DVT in the distal femoral and popliteal veins.   · Patient continues anticoagulation with Xarelto 20 mg daily.   · She has  pain in the distal aspect of the left thigh at the site of the chronic thrombus.    · No evidence of new thrombosis.    *Anemia secondary to iron deficiency in a patient with Beta thalassemia trait.   · She was placed on ferrous sulfate 325 mg a day on 4/25/2019.  She took the ferrous sulfate for 6 months.    · Hemoglobin was normal at 13.2 on 7/30/2020.    · Hemoglobin dropped to 11.7 on 9/22/2020.  · Patient was started on ferrous sulfate 325 mg daily  on 9/22/2020.  · She did not respond to tolerate the oral iron which was eventually discontinued.  · Patient was given IV Venofer x2 doses on 2/26/2021 and 3/12/2021.  · Hemoglobin was 13.3 on 12/13/2021 with a transferrin saturation of 25% and ferritin of 567.  · Hemoglobin was 12.6 on 6/6/2022.  Iron stores were adequate with ferritin of 286 and transferrin saturation 19%.  · Hemoglobin is 14.1 today.  · Iron stores are adequate.     *History of vaginal bleeding secondary to uterine fibroids.    · Patient reported to have enlargement of the uterus to a 20 week gestation size.    · She was referred to gynecology oncology and surgery was recommended to be done ASAP.    · They recommended having an IVC filter placed in preparation for the surgery.  · Due to the timeframe from the diagnosis of a PE and since the patient was symptomatic in September 2020, the recommendation was to do radiosurgery ~6 months from the time of diagnosis of the PE (February 2021).  · Patient decided in February 2021 that she did not want to have the surgery.  · Patient reported in June 2022 that she was no longer having vaginal bleeding.  · Patient states that the bleeding has not recurred.  She is likely in menopause.  · I recommended continued follow-up with gynecology to reevaluate the fibroids-expected to decrease in size after menopause.    *Elevated serum creatinine.   · Creatinine was 1.18 on 81/18/2022.  · Patient increased her fluid intake.   · Creatinine improved to 1.10 today.    PLAN:    1.  Continue Xarelto 20 mg daily.  New prescription was sent to her pharmacy.   2.  She does not need IV iron at this point.   3.  Maintain adequate hydration and avoid NSAIDs.   4.  Follow-up in August 2023 with CBC CMP ferritin iron panel.  5.  Due to her being at increased risk for recurrent thrombosis (DVT and PE), I recommended avoiding surgeries unless they are absolutely medically necessary.        Merly Giang MD  01/24/23

## 2023-02-20 ENCOUNTER — OFFICE VISIT (OUTPATIENT)
Dept: GASTROENTEROLOGY | Facility: CLINIC | Age: 58
End: 2023-02-20
Payer: COMMERCIAL

## 2023-02-20 ENCOUNTER — TELEPHONE (OUTPATIENT)
Dept: GASTROENTEROLOGY | Facility: CLINIC | Age: 58
End: 2023-02-20
Payer: COMMERCIAL

## 2023-02-20 VITALS
OXYGEN SATURATION: 97 % | HEART RATE: 89 BPM | HEIGHT: 67 IN | BODY MASS INDEX: 36.6 KG/M2 | SYSTOLIC BLOOD PRESSURE: 111 MMHG | TEMPERATURE: 96.9 F | WEIGHT: 233.2 LBS | DIASTOLIC BLOOD PRESSURE: 73 MMHG

## 2023-02-20 DIAGNOSIS — K63.5 HYPERPLASTIC COLONIC POLYP, UNSPECIFIED PART OF COLON: ICD-10-CM

## 2023-02-20 DIAGNOSIS — Z80.0 FH: STOMACH CANCER: ICD-10-CM

## 2023-02-20 DIAGNOSIS — K21.9 GASTROESOPHAGEAL REFLUX DISEASE: ICD-10-CM

## 2023-02-20 DIAGNOSIS — K21.00 GASTROESOPHAGEAL REFLUX DISEASE WITH ESOPHAGITIS WITHOUT HEMORRHAGE: Primary | ICD-10-CM

## 2023-02-20 DIAGNOSIS — K62.5 RECTAL BLEEDING: ICD-10-CM

## 2023-02-20 DIAGNOSIS — K59.00 CONSTIPATION, UNSPECIFIED CONSTIPATION TYPE: ICD-10-CM

## 2023-02-20 PROCEDURE — 99214 OFFICE O/P EST MOD 30 MIN: CPT | Performed by: NURSE PRACTITIONER

## 2023-02-20 RX ORDER — CLOTRIMAZOLE 1 %
CREAM (GRAM) TOPICAL
COMMUNITY
Start: 2022-11-02

## 2023-02-20 RX ORDER — ATORVASTATIN CALCIUM 20 MG/1
1 TABLET, FILM COATED ORAL DAILY
COMMUNITY
Start: 2022-12-12

## 2023-02-20 RX ORDER — METHYLPREDNISOLONE 4 MG/1
TABLET ORAL
COMMUNITY
Start: 2023-02-06 | End: 2023-02-20

## 2023-02-20 RX ORDER — OMEPRAZOLE 40 MG/1
40 CAPSULE, DELAYED RELEASE ORAL DAILY
Qty: 90 CAPSULE | Refills: 3 | Status: SHIPPED | OUTPATIENT
Start: 2023-02-20

## 2023-02-20 RX ORDER — BACLOFEN 10 MG/1
1 TABLET ORAL EVERY 12 HOURS SCHEDULED
COMMUNITY
Start: 2023-02-06

## 2023-02-20 NOTE — TELEPHONE ENCOUNTER
It is noted that Xarelto is prescribed by DR Merly Giang.      Message to DR Giang checking if pt may hold Xarelto for 48 hours prior to EGD and colonoscopy scheduled for 7/19/23.

## 2023-02-20 NOTE — TELEPHONE ENCOUNTER
OK FOR HUB TO READ  PEPE patient via telephone for EGD/COLONOSCOPY. Scheduled 7/19/23 with arrival time of 815AM. Prep paperwork mailed to verified address on file. Patient advised arrival time may change based on [facility] guidelines. PEPE LO

## 2023-02-20 NOTE — PROGRESS NOTES
Chief Complaint   Patient presents with   • Heartburn       Kylie Bowie is a  58 y.o. female here for a follow up visit for GERD.  HPI  58-year-old female presents today for follow-up visit for GERD.  She is a patient of Dr. Flood.  She was last seen in the office by me on 1/2021.  She has a history of GERD/esophagitis and admits she does really well when she takes her omeprazole 40 mg daily.  She has been out of it for a little while and definitely needs a refill.  She tells me her reflux symptoms are starting to come back.  Her last EGD and colonoscopy was on 11/2017.  She does have a history of hyperplastic colon polyps.  She has a GI family history of a maternal grandmother with stomach cancer.  She tells me she knows she is overdue for scopes.  She does report a few episodes of diarrhea recently with some rectal bleeding when wiping.  She tells me the rectal bleeding and diarrhea have since resolved.  She tells me her bowels are back to normal for her which is usually soft formed stools several times a day.  She does me this last week and she has been eating a lot of greens trying to get more fiber in her diet.  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  She denies any dysphagia, nausea and vomiting, or melena.  She admits her appetite is good and her weight is down 7 pounds since June of last year.  Past Medical History:   Diagnosis Date   • Arthritis    • Asthma    • Beta thalassemia trait    • COVID-19 2021   • Factor V Leiden mutation (HCC)     HETEROZYGOUS   • GERD (gastroesophageal reflux disease)    • H/O Humeral head fracture 2016   • H/O Left rotator cuff tear    • History of DVT of lower extremity    • History of iron deficiency anemia    • History of transfusion    • Hypertension    • Leiomyoma 1999   • Pinched nerve in neck    • PONV (postoperative nausea and vomiting)    • Thrombophlebitis     Septic       Past  Surgical History:   Procedure Laterality Date   • ANKLE SURGERY Left    •  SECTION  2003   • COLONOSCOPY N/A 2017    Two 2-3mm polypsin the sigmoid and in the transverse colon, NBIH.  PATH: Hyperplastic polyps   • DIAGNOSTIC LAPAROSCOPY EXPLORATORY LAPAROTOMY  2000   • ENDOSCOPY N/A 2017    LA Grade B reflux esophagitis, small HH, erythematous mucosa in the antrum.  PATH: Focal minimal chronic inflammation   • FOOT SURGERY Left 2015    Kidner procedure with excision of accessory bone   • HYSTEROTOMY  1999   • MYOMECTOMY         Scheduled Meds:    Continuous Infusions:No current facility-administered medications for this visit.      PRN Meds:.    Allergies   Allergen Reactions   • Keflex [Cephalexin] Hives   • Latex Hives   • Amoxicillin-Pot Clavulanate Diarrhea   • Penicillins Hives       Social History     Socioeconomic History   • Marital status: Single   • Number of children: 2   Tobacco Use   • Smoking status: Never   • Smokeless tobacco: Never   Substance and Sexual Activity   • Alcohol use: Yes     Comment: social   • Drug use: No   • Sexual activity: Defer       Family History   Problem Relation Age of Onset   • Heart disease Other    • Hypertension Mother    • Heart disease Mother    • Heart disease Father    • Diabetes Father    • Cancer Maternal Aunt         Breast   • Diabetes Maternal Aunt    • Lupus Paternal Aunt    • Cancer Maternal Grandmother    • Anemia Cousin    • Cancer Cousin    • Cancer Cousin         leukemia       Review of Systems   Constitutional: Negative for appetite change, chills, diaphoresis, fatigue, fever and unexpected weight change.   HENT: Negative for nosebleeds, postnasal drip, sore throat, trouble swallowing and voice change.    Respiratory: Negative for cough, choking, chest tightness, shortness of breath, wheezing and stridor.    Cardiovascular: Negative for chest pain, palpitations and leg swelling.   Gastrointestinal: Positive for anal  bleeding and diarrhea. Negative for abdominal distention, abdominal pain, blood in stool, constipation, nausea, rectal pain and vomiting.   Endocrine: Negative for polydipsia, polyphagia and polyuria.   Musculoskeletal: Negative for gait problem.   Skin: Negative for rash and wound.   Allergic/Immunologic: Negative for food allergies.   Neurological: Negative for dizziness, speech difficulty and light-headedness.   Psychiatric/Behavioral: Negative for confusion, self-injury, sleep disturbance and suicidal ideas.       Vitals:    02/20/23 1253   BP: 111/73   Pulse: 89   Temp: 96.9 °F (36.1 °C)   SpO2: 97%       Physical Exam  Constitutional:       General: She is not in acute distress.     Appearance: She is well-developed. She is not ill-appearing.   HENT:      Head: Normocephalic.   Eyes:      Pupils: Pupils are equal, round, and reactive to light.   Cardiovascular:      Rate and Rhythm: Normal rate and regular rhythm.      Heart sounds: Normal heart sounds.   Pulmonary:      Effort: Pulmonary effort is normal.      Breath sounds: Normal breath sounds.   Abdominal:      General: Bowel sounds are normal. There is no distension.      Palpations: Abdomen is soft. There is no mass.      Tenderness: There is no abdominal tenderness. There is no guarding or rebound.      Hernia: No hernia is present.   Musculoskeletal:         General: Normal range of motion.   Skin:     General: Skin is warm and dry.   Neurological:      Mental Status: She is alert and oriented to person, place, and time.   Psychiatric:         Speech: Speech normal.         Behavior: Behavior normal.         Judgment: Judgment normal.         No radiology results for the last 7 days     Diagnoses and all orders for this visit:    1. Gastroesophageal reflux disease with esophagitis without hemorrhage (Primary)  -     Case Request; Standing  -     Case Request    2. Rectal bleeding  -     Case Request; Standing  -     Case Request    3. Constipation,  unspecified constipation type  -     Case Request; Standing  -     Case Request    4. Hyperplastic colonic polyp, unspecified part of colon  -     Case Request; Standing  -     Case Request    5. FH: stomach cancer  -     Case Request; Standing  -     Case Request    6. Gastroesophageal reflux disease  -     omeprazole (priLOSEC) 40 MG capsule; Take 1 capsule by mouth Daily.  Dispense: 90 capsule; Refill: 3    Other orders  -     Follow Anesthesia Guidelines / Protocol; Future  -     Obtain Informed Consent; Future  -     Obtain Informed Consent; Standing  -     Verify Bowel Prep Was Successful; Standing  -     Give Tap Water Enema If Bowel Prep Insufficient; Standing       Reviewed most recent lab work with her today.  Hemoglobin stable at 14.1.  Given her history and current symptoms recommend an EGD and colonoscopy with Dr. Flood for further evaluation.  Patient is overdue for scopes anyway.  She is agreeable to the scopes.  Rectal bleeding and diarrhea have since resolved.  Now back to having her normal soft frequent daily stools.  GERD seems well controlled when she takes omeprazole 40 mg daily.  Continue GERD precautions.  I will refill for 1 year.  She is on Xarelto secondary to DVTs and I will have nursing obtain cardiac clearance prior to her scopes.  Patient to call the office with any issues.  Patient to follow-up with us after her scopes.  Patient is agreeable to the plan.

## 2023-02-20 NOTE — TELEPHONE ENCOUNTER
----- Message from OLEKSANDR Alan sent at 2/20/2023  1:29 PM EST -----  Please obtain cardiac clearance for the patient to undergo an EGD and colonoscopy with Dr. Flood.  She is on Xarelto.  Thanks

## 2023-02-21 NOTE — TELEPHONE ENCOUNTER
Call to pt.  Advise per DR Giang's note - (hold xarelto 7/17 - 7/19).  Verb understanding.     Update to Dr Flood.

## 2023-02-21 NOTE — TELEPHONE ENCOUNTER
February 21, 2023  Merly Giang MD  to Me • ClemenciaAngelica farmer Nellie, APRN        3:16 PM  Okay to proceed with the procedure.     I recommend holding Xarelto 2 days before the procedure.     I recommend starting back on Xarelto the day following the procedure.     During the time that she is off Xarelto, recommend using compression stockings.     Thank you

## 2023-06-08 ENCOUNTER — TELEPHONE (OUTPATIENT)
Dept: GASTROENTEROLOGY | Facility: CLINIC | Age: 58
End: 2023-06-08

## 2023-06-08 NOTE — TELEPHONE ENCOUNTER
"  Caller: Kylie Bowie \"Holly\"    Relationship: Self    Best call back number: [phone]    Who are you requesting to speak with (clinical staff, provider,  specific staff member): CLINICAL STAFF    What was the call regarding: PT REQUESTS PREP, TIME OF ARRIVAL, LOCATION, ETC BE SENT VIA MAIL. PT HAS TROUBLE WITH [employer]. GAVE PT HELP DESK NUMBER. YOU CAN TRY TO SEND VIA [employer] BUT SHE REQUESTS MAIL TO BE SURE SHE GETS IT     Is it okay if the provider responds through [employer]: YES        "

## 2023-07-18 RX ORDER — GABAPENTIN 300 MG/1
300 CAPSULE ORAL NIGHTLY PRN
COMMUNITY

## 2023-07-19 ENCOUNTER — HOSPITAL ENCOUNTER (OUTPATIENT)
Facility: HOSPITAL | Age: 58
Setting detail: HOSPITAL OUTPATIENT SURGERY
Discharge: HOME | End: 2023-07-19
Attending: INTERNAL MEDICINE | Admitting: INTERNAL MEDICINE
Payer: COMMERCIAL

## 2023-07-19 VITALS
RESPIRATION RATE: 16 BRPM | HEIGHT: 67 IN | OXYGEN SATURATION: 96 % | DIASTOLIC BLOOD PRESSURE: 75 MMHG | SYSTOLIC BLOOD PRESSURE: 115 MMHG | WEIGHT: 229 LBS | BODY MASS INDEX: 35.94 KG/M2 | HEART RATE: 73 BPM

## 2023-07-19 DIAGNOSIS — K62.5 RECTAL BLEEDING: ICD-10-CM

## 2023-07-19 DIAGNOSIS — K63.5 HYPERPLASTIC COLONIC POLYP, UNSPECIFIED PART OF COLON: ICD-10-CM

## 2023-07-19 DIAGNOSIS — K21.00 GASTROESOPHAGEAL REFLUX DISEASE WITH ESOPHAGITIS WITHOUT HEMORRHAGE: ICD-10-CM

## 2023-07-19 DIAGNOSIS — Z80.0 FH: STOMACH CANCER: ICD-10-CM

## 2023-07-19 DIAGNOSIS — K59.00 CONSTIPATION, UNSPECIFIED CONSTIPATION TYPE: ICD-10-CM

## 2023-07-19 PROCEDURE — 45385 COLONOSCOPY W/LESION REMOVAL: CPT | Performed by: INTERNAL MEDICINE

## 2023-07-19 PROCEDURE — S0260 H&P FOR SURGERY: HCPCS | Performed by: INTERNAL MEDICINE

## 2023-07-19 PROCEDURE — 88305 TISSUE EXAM BY PATHOLOGIST: CPT | Performed by: INTERNAL MEDICINE

## 2023-07-19 PROCEDURE — 43235 EGD DIAGNOSTIC BRUSH WASH: CPT | Performed by: INTERNAL MEDICINE

## 2023-07-19 RX ORDER — SODIUM CHLORIDE 0.9 % (FLUSH) 0.9 %
10 SYRINGE (ML) INJECTION AS NEEDED
Status: DISCONTINUED | OUTPATIENT
Start: 2023-07-19 | End: 2023-07-19 | Stop reason: HOSPADM

## 2023-07-19 RX ORDER — LIDOCAINE HYDROCHLORIDE 10 MG/ML
0.5 INJECTION, SOLUTION INFILTRATION; PERINEURAL ONCE AS NEEDED
Status: DISCONTINUED | OUTPATIENT
Start: 2023-07-19 | End: 2023-07-19 | Stop reason: HOSPADM

## 2023-07-19 RX ORDER — SODIUM CHLORIDE, SODIUM LACTATE, POTASSIUM CHLORIDE, CALCIUM CHLORIDE 600; 310; 30; 20 MG/100ML; MG/100ML; MG/100ML; MG/100ML
1000 INJECTION, SOLUTION INTRAVENOUS CONTINUOUS
Status: DISCONTINUED | OUTPATIENT
Start: 2023-07-19 | End: 2023-07-19 | Stop reason: HOSPADM

## 2023-07-19 NOTE — H&P
[facility]  Pre Procedure History & Physical    Chief Complaint:   Gerd, h/o polyps    Subjective     HPI:   57 yo here today for egd/colonoscopy.  Pt reports no FH CRC/polyps.  Patient denies GI symptoms currrently.  Last exam     Past Medical History:   Past Medical History:   Diagnosis Date    Arthritis     Asthma     Beta thalassemia trait     COVID-19     Edema     FEET/ANKLE BILAT    Elevated cholesterol     Factor V Leiden mutation     HETEROZYGOUS. SEES DR. KHAN    Frequent urination     GERD (gastroesophageal reflux disease)     History of DVT of lower extremity     History of iron deficiency anemia     History of pulmonary embolism     XARELTO    Hypertension     IBS (irritable bowel syndrome)     Leiomyoma     OA (osteoarthritis)     STATES ALL OVER    PONV (postoperative nausea and vomiting)     Stress incontinence     WEARS DAILY PAD    Thrombophlebitis     Septic       Past Surgical History:  Past Surgical History:   Procedure Laterality Date    ANKLE SURGERY Left      SECTION  2003    COLONOSCOPY N/A 2017    Two 2-3mm polypsin the sigmoid and in the transverse colon, NBIH.  PATH: Hyperplastic polyps    DIAGNOSTIC LAPAROSCOPY EXPLORATORY LAPAROTOMY  2000    ENDOSCOPY N/A 2017    LA Grade B reflux esophagitis, small HH, erythematous mucosa in the antrum.  PATH: Focal minimal chronic inflammation    FOOT SURGERY Left 2015    Kidner procedure with excision of accessory bone    HYSTEROTOMY  1999    MYOMECTOMY         Family History:  Family History   Problem Relation Age of Onset    Hypertension Mother     Heart disease Mother     Heart disease Father     Diabetes Father     Cancer Maternal Aunt         Breast    Diabetes Maternal Aunt     Lupus Paternal Aunt     Cancer Maternal Grandmother     Anemia Cousin     Cancer Cousin     Cancer Cousin         leukemia    Heart disease Other     Learning disabilities Neg Hx        Social  "History:   reports that she has never smoked. She has never used smokeless tobacco. She reports current alcohol use. She reports that she does not use drugs.    Medications:   Medications Prior to Admission   Medication Sig Dispense Refill Last Dose    albuterol (PROVENTIL HFA;VENTOLIN HFA) 108 (90 BASE) MCG/ACT inhaler Inhale 2 puffs Every 4 (Four) Hours As Needed for Wheezing.   Past Month    atorvastatin (LIPITOR) 20 MG tablet Take 1 tablet by mouth Daily.       baclofen (LIORESAL) 10 MG tablet Take 1 tablet by mouth As Needed.       chlorthalidone (HYGROTON) 25 MG tablet Take 1 tablet by mouth Daily.       diclofenac (VOLTAREN) 1 % gel gel Apply 4 g topically to the appropriate area as directed As Needed. CURRENTLY NOT USING  2     fluticasone (FLONASE) 50 MCG/ACT nasal spray 2 sprays into the nostril(s) as directed by provider Daily. Administer 2 sprays in each nostril for each dose. (Patient taking differently: 2 sprays into the nostril(s) as directed by provider As Needed.) 3 bottle 1     gabapentin (NEURONTIN) 300 MG capsule Take 1 capsule by mouth At Night As Needed.       HYDROcodone-acetaminophen (NORCO) 5-325 MG per tablet Take 1 tablet by mouth 3 (Three) Times a Day. OA       lisinopril (PRINIVIL,ZESTRIL) 20 MG tablet Take 1 tablet by mouth Daily.       montelukast (SINGULAIR) 10 MG tablet Take 1 tablet by mouth Every Night. 90 tablet 1     omeprazole (priLOSEC) 40 MG capsule Take 1 capsule by mouth Daily. 90 capsule 3 7/19/2023    rivaroxaban (Xarelto) 20 MG tablet Take 1 tablet by mouth Daily. (Patient taking differently: Take 1 tablet by mouth Daily. HOLD FOR PROCEDURE  FACTOR 5) 90 tablet 1 7/16/2023       Allergies:  Latex and Amoxicillin-pot clavulanate    ROS:    Pertinent items are noted in HPI, all other systems reviewed and negative     Objective     Blood pressure 116/89, pulse 76, resp. rate 18, height 170.2 cm (67\"), weight 104 kg (229 lb), last menstrual period 11/14/2017, SpO2 99 " %.    Physical Exam   Constitutional: Pt is oriented to person, place, and time and well-developed, well-nourished, and in no distress.   Mouth/Throat: Oropharynx is clear and moist.   Neck: Normal range of motion.   Cardiovascular: Normal rate, regular rhythm    Pulmonary/Chest: Effort normal    Abdominal: Soft. Nontender  Skin: Skin is warm and dry.   Psychiatric: Mood, memory, affect and judgment normal.     Assessment & Plan     Diagnosis:  Gerd, h/o polyps    Anticipated Surgical Procedure:  Egd/colonoscopy    The risks, benefits, and alternatives of this procedure have been discussed with the patient or the responsible party- the patient understands and agrees to proceed.

## 2023-07-19 NOTE — DISCHARGE INSTRUCTIONS
For the next 24 hours patient needs to be with a responsible adult.    For 24 hours DO NOT drive, operate machinery, appliances, drink alcohol, make important decisions or sign legal documents.    Start with a light or bland diet if you are feeling sick to your stomach otherwise advance to regular diet as tolerated.    Follow recommendations on procedure report if provided by your doctor.    Call Dr Flood for problems [phone]    Problems may include but not limited to: large amounts of bleeding, trouble breathing, repeated vomiting, severe unrelieved pain, fever or chills.      RESUME XARELTO ON SATURDAY.

## 2023-07-20 LAB
LAB AP CASE REPORT: NORMAL
PATH REPORT.FINAL DX SPEC: NORMAL
PATH REPORT.GROSS SPEC: NORMAL

## 2023-07-25 ENCOUNTER — TELEPHONE (OUTPATIENT)
Dept: GASTROENTEROLOGY | Facility: CLINIC | Age: 58
End: 2023-07-25
Payer: COMMERCIAL

## 2023-07-25 NOTE — TELEPHONE ENCOUNTER
Called pt and advised of Dr Flood's note.  Pt verbalized understanding.     Colonoscopy placed in  and recall for 7/16/28.

## 2023-07-25 NOTE — TELEPHONE ENCOUNTER
----- Message from Debra Flood MD sent at 7/25/2023  2:41 PM EDT -----  The polyp(s) biopsies showed adenomatous change. This is not cancerous but is considered potentially precancerous. Follow-up colonoscopy in 5 years is advised.

## 2023-08-17 ENCOUNTER — LAB (OUTPATIENT)
Dept: LAB | Facility: HOSPITAL | Age: 58
End: 2023-08-17
Payer: COMMERCIAL

## 2023-08-17 ENCOUNTER — OFFICE VISIT (OUTPATIENT)
Dept: ONCOLOGY | Facility: CLINIC | Age: 58
End: 2023-08-17
Payer: COMMERCIAL

## 2023-08-17 VITALS
TEMPERATURE: 96.2 F | HEART RATE: 90 BPM | RESPIRATION RATE: 16 BRPM | BODY MASS INDEX: 36.51 KG/M2 | OXYGEN SATURATION: 94 % | DIASTOLIC BLOOD PRESSURE: 67 MMHG | WEIGHT: 232.6 LBS | SYSTOLIC BLOOD PRESSURE: 100 MMHG | HEIGHT: 67 IN

## 2023-08-17 DIAGNOSIS — D68.51 FACTOR V LEIDEN MUTATION: ICD-10-CM

## 2023-08-17 DIAGNOSIS — I82.512 CHRONIC DEEP VEIN THROMBOSIS (DVT) OF LEFT FEMORAL VEIN: Primary | ICD-10-CM

## 2023-08-17 DIAGNOSIS — D50.0 IRON DEFICIENCY ANEMIA DUE TO CHRONIC BLOOD LOSS: ICD-10-CM

## 2023-08-17 DIAGNOSIS — I26.99 BILATERAL PULMONARY EMBOLISM: ICD-10-CM

## 2023-08-17 DIAGNOSIS — I82.512 CHRONIC DEEP VEIN THROMBOSIS (DVT) OF LEFT FEMORAL VEIN: ICD-10-CM

## 2023-08-17 DIAGNOSIS — Z87.898 HISTORY OF MOTION SICKNESS: ICD-10-CM

## 2023-08-17 DIAGNOSIS — R79.89 ELEVATED SERUM CREATININE: ICD-10-CM

## 2023-08-17 LAB
ALBUMIN SERPL-MCNC: 3.9 G/DL (ref 3.5–5.2)
ALBUMIN/GLOB SERPL: 1.3 G/DL
ALP SERPL-CCNC: 89 U/L (ref 39–117)
ALT SERPL W P-5'-P-CCNC: 24 U/L (ref 1–33)
ANION GAP SERPL CALCULATED.3IONS-SCNC: 12 MMOL/L (ref 5–15)
AST SERPL-CCNC: 41 U/L (ref 1–32)
BASOPHILS # BLD AUTO: 0.05 10*3/MM3 (ref 0–0.2)
BASOPHILS NFR BLD AUTO: 0.8 % (ref 0–1.5)
BILIRUB SERPL-MCNC: 0.5 MG/DL (ref 0–1.2)
BUN SERPL-MCNC: 14 MG/DL (ref 6–20)
BUN/CREAT SERPL: 12.4 (ref 7–25)
CALCIUM SPEC-SCNC: 8.7 MG/DL (ref 8.6–10.5)
CHLORIDE SERPL-SCNC: 103 MMOL/L (ref 98–107)
CO2 SERPL-SCNC: 28 MMOL/L (ref 22–29)
CREAT SERPL-MCNC: 1.13 MG/DL (ref 0.6–1.1)
DEPRECATED RDW RBC AUTO: 42.8 FL (ref 37–54)
EGFRCR SERPLBLD CKD-EPI 2021: 56.9 ML/MIN/1.73
EOSINOPHIL # BLD AUTO: 0.06 10*3/MM3 (ref 0–0.4)
EOSINOPHIL NFR BLD AUTO: 1 % (ref 0.3–6.2)
ERYTHROCYTE [DISTWIDTH] IN BLOOD BY AUTOMATED COUNT: 13.9 % (ref 12.3–15.4)
FERRITIN SERPL-MCNC: 461 NG/ML (ref 13–150)
GLOBULIN UR ELPH-MCNC: 3 GM/DL
GLUCOSE SERPL-MCNC: 114 MG/DL (ref 65–99)
HCT VFR BLD AUTO: 38.4 % (ref 34–46.6)
HGB BLD-MCNC: 12.7 G/DL (ref 12–15.9)
IMM GRANULOCYTES # BLD AUTO: 0.01 10*3/MM3 (ref 0–0.05)
IMM GRANULOCYTES NFR BLD AUTO: 0.2 % (ref 0–0.5)
IRON 24H UR-MRATE: 77 MCG/DL (ref 37–145)
IRON SATN MFR SERPL: 23 % (ref 20–50)
LYMPHOCYTES # BLD AUTO: 2.15 10*3/MM3 (ref 0.7–3.1)
LYMPHOCYTES NFR BLD AUTO: 34.3 % (ref 19.6–45.3)
MCH RBC QN AUTO: 27.9 PG (ref 26.6–33)
MCHC RBC AUTO-ENTMCNC: 33.1 G/DL (ref 31.5–35.7)
MCV RBC AUTO: 84.2 FL (ref 79–97)
MONOCYTES # BLD AUTO: 0.65 10*3/MM3 (ref 0.1–0.9)
MONOCYTES NFR BLD AUTO: 10.4 % (ref 5–12)
NEUTROPHILS NFR BLD AUTO: 3.35 10*3/MM3 (ref 1.7–7)
NEUTROPHILS NFR BLD AUTO: 53.3 % (ref 42.7–76)
NRBC BLD AUTO-RTO: 0 /100 WBC (ref 0–0.2)
PLATELET # BLD AUTO: 239 10*3/MM3 (ref 140–450)
PMV BLD AUTO: 10.6 FL (ref 6–12)
POTASSIUM SERPL-SCNC: 3.7 MMOL/L (ref 3.5–5.2)
PROT SERPL-MCNC: 6.9 G/DL (ref 6–8.5)
RBC # BLD AUTO: 4.56 10*6/MM3 (ref 3.77–5.28)
SODIUM SERPL-SCNC: 143 MMOL/L (ref 136–145)
TIBC SERPL-MCNC: 340 MCG/DL (ref 298–536)
TRANSFERRIN SERPL-MCNC: 243 MG/DL (ref 200–360)
WBC NRBC COR # BLD: 6.27 10*3/MM3 (ref 3.4–10.8)

## 2023-08-17 PROCEDURE — 85025 COMPLETE CBC W/AUTO DIFF WBC: CPT

## 2023-08-17 PROCEDURE — 82728 ASSAY OF FERRITIN: CPT

## 2023-08-17 PROCEDURE — 84466 ASSAY OF TRANSFERRIN: CPT

## 2023-08-17 PROCEDURE — 80053 COMPREHEN METABOLIC PANEL: CPT

## 2023-08-17 PROCEDURE — 83540 ASSAY OF IRON: CPT

## 2023-08-17 PROCEDURE — 36415 COLL VENOUS BLD VENIPUNCTURE: CPT

## 2023-08-17 RX ORDER — SCOLOPAMINE TRANSDERMAL SYSTEM 1 MG/1
1 PATCH, EXTENDED RELEASE TRANSDERMAL
Qty: 4 PATCH | Refills: 0 | Status: SHIPPED | OUTPATIENT
Start: 2023-08-17

## 2023-08-17 NOTE — PROGRESS NOTES
Subjective     CHIEF COMPLAINT:      Chief Complaint   Patient presents with    Follow-up     PAIN BEHIND LEFT KNEE(NO PAIN NOW)/SWELLING IN FEET     HISTORY OF PRESENT ILLNESS:     Kylie Bowie is a 58 y.o. female patient who returns today for follow up on her DVT/PE and anemia.  She returns today for follow-up reporting that she is going to travel by plane.  She will then be on a cruise.  She is taking Xarelto 20 mg daily.  She is not having problems with bleeding or bruising.  She has swelling in the legs and she takes her diuretic regularly.  She reports intermittent pain in the back of the left knee.  She has compression stockings but she finds it difficult to wear them during the summer months.    ROS:  Pertinent ROS is in the HPI.     Past medical, surgical, social and family history were reviewed.     MEDICATIONS:    Current Outpatient Medications:     albuterol (PROVENTIL HFA;VENTOLIN HFA) 108 (90 BASE) MCG/ACT inhaler, Inhale 2 puffs Every 4 (Four) Hours As Needed for Wheezing., Disp: , Rfl:     atorvastatin (LIPITOR) 20 MG tablet, Take 1 tablet by mouth Daily., Disp: , Rfl:     chlorthalidone (HYGROTON) 25 MG tablet, Take 1 tablet by mouth Daily., Disp: , Rfl:     fluticasone (FLONASE) 50 MCG/ACT nasal spray, 2 sprays into the nostril(s) as directed by provider Daily. Administer 2 sprays in each nostril for each dose., Disp: 3 bottle, Rfl: 1    gabapentin (NEURONTIN) 300 MG capsule, Take 1 capsule by mouth At Night As Needed., Disp: , Rfl:     HYDROcodone-acetaminophen (NORCO) 5-325 MG per tablet, Take 1 tablet by mouth 3 (Three) Times a Day. OA, Disp: , Rfl:     lisinopril (PRINIVIL,ZESTRIL) 20 MG tablet, Take 1 tablet by mouth Daily., Disp: , Rfl:     montelukast (SINGULAIR) 10 MG tablet, Take 1 tablet by mouth Every Night., Disp: 90 tablet, Rfl: 1    omeprazole (priLOSEC) 40 MG capsule, Take 1 capsule by mouth Daily., Disp: 90 capsule, Rfl: 3    rivaroxaban (Xarelto) 20 MG tablet, Take 1 tablet by  "mouth Daily. (Patient taking differently: Take 1 tablet by mouth Daily. HOLD FOR PROCEDURE FACTOR 5), Disp: 90 tablet, Rfl: 1    diclofenac (VOLTAREN) 1 % gel gel, Apply 4 g topically to the appropriate area as directed As Needed. CURRENTLY NOT USING (Patient not taking: Reported on 8/17/2023), Disp: , Rfl: 2  Objective     VITAL SIGNS:     Vitals:    08/17/23 1241   BP: 100/67   Pulse: 90   Resp: 16   Temp: 96.2 øF (35.7 øC)   TempSrc: Temporal   SpO2: 94%   Weight: 106 kg (232 lb 9.6 oz)   Height: 170.2 cm (67.01\")   PainSc: 0-No pain     Body mass index is 36.42 kg/mý.     Wt Readings from Last 5 Encounters:   08/17/23 106 kg (232 lb 9.6 oz)   07/19/23 104 kg (229 lb)   02/20/23 106 kg (233 lb 3.2 oz)   01/24/23 106 kg (232 lb 11.2 oz)   06/06/22 109 kg (240 lb 1.6 oz)     PHYSICAL EXAMINATION:   GENERAL: The patient appears in good general condition, not in acute distress.   SKIN: No ecchymosis.  EYES: No jaundice. No pallor.  CHEST: Normal respiratory effort.  Lungs clear bilaterally.  No added sounds.  CVS: Trace distal leg edema.  EXTREMITIES: No erythema or warmth.  No tenderness on exam of the posterior aspect of the left knee.    DIAGNOSTIC DATA:     Results from last 7 days   Lab Units 08/17/23  1212   WBC 10*3/mm3 6.27   NEUTROS ABS 10*3/mm3 3.35   HEMOGLOBIN g/dL 12.7   HEMATOCRIT % 38.4   PLATELETS 10*3/mm3 239     Results from last 7 days   Lab Units 08/17/23  1212   SODIUM mmol/L 143   POTASSIUM mmol/L 3.7   CHLORIDE mmol/L 103   CO2 mmol/L 28.0   BUN mg/dL 14   CREATININE mg/dL 1.13*   CALCIUM mg/dL 8.7   ALBUMIN g/dL 3.9   BILIRUBIN mg/dL 0.5   ALK PHOS U/L 89   ALT (SGPT) U/L 24   AST (SGOT) U/L 41*   GLUCOSE mg/dL 114*         Lab 08/17/23  1212   IRON 77   IRON SATURATION (TSAT) 23   TIBC 340   TRANSFERRIN 243   FERRITIN 461.00*      Pathology exam from 7/19/2023:  1. Colon, Cecum, Biopsy:               A. Fragments of tubular adenoma.     2. Colon, Ascending, Biopsy: Benign colonic mucosa " with               A. No hyperplastic or tubulovillous change.               B. No significant inflammation.               C. No crypt distortion or basement membrane thickening.               D. No viral inclusions or other organisms on routinely stained sections.               E. Benign lymphoid aggregate.    Assessment & Plan    *Factor V Leiden heterozygous mutation.  She has a history of left lower extremity DVT that developed after a myomectomy, D&C in 1999.   She had a foot surgery and was placed on Xarelto 10 mg daily after the surgery.   She was diagnosed with acute PE and left lower extremity DVT on 8/19/2020.  There was no right heart strain.  Acute DVT was identified in the left femoral, popliteal, posterior tibial, peroneal and gastrocnemius/soleal veins.  She was hospitalized at [facility] on 8/19/2020 and anticoagulated with Lovenox.    VQ scan on 9/9/2020 showed no perfusion defects.    Venous Doppler on 9/9/2020 showed the thrombosis to have become chronic.  Based on recurrent thrombosis and the unprovoked acute DVT and PE in August 2020, lifelong anticoagulation was recommended.  CT angiogram on 2/15/2021 revealed resolution of the pulmonary embolism.  Venous doppler on 2/15/2021 revealed a chronic thrombus in the distal femoral and popliteal veins.  Venous Doppler on 8/20/2021 revealed chronic thrombus in the distal femoral, popliteal and gastrocnemius veins.  Bridging with Lovenox 30 mg every 12 hours immediately after surgery was recommended if she requires future orthopedic surgeries.  Due to her increased risk for postoperative thrombosis, her Orthopedic surgeon did not recommend surgery.  Venous Doppler on 1/16/2023 revealed chronic DVT in the distal femoral and popliteal veins.   She is taking Xarelto 20 mg daily regularly.  She is tolerating it without problem with bleeding.  She reports intermittent pain in the left leg and behind the knee.  Exam today revealed no erythema or warmth.   No signs of new thrombosis.  She is going to travel by plane next month.    *Anemia secondary to iron deficiency in a patient with Beta thalassemia trait.   She was placed on ferrous sulfate 325 mg a day on 4/25/2019.  She took the ferrous sulfate for 6 months.    Hemoglobin was normal at 13.2 on 7/30/2020.    Hemoglobin dropped to 11.7 on 9/22/2020.  Patient was started on ferrous sulfate 325 mg daily on 9/22/2020.  She did not respond to tolerate the oral iron which was eventually discontinued.  Patient was given IV Venofer x2 doses on 2/26/2021 and 3/12/2021.  Hemoglobin was 13.3 on 12/13/2021 with a transferrin saturation of 25% and ferritin of 567.  Hemoglobin was 12.6 on 6/6/2022.  Iron stores were adequate with ferritin of 286 and transferrin saturation 19%.  1/24/2023: Hemoglobin 14.1.  Iron stores adequate.  8/17/2023: Hemoglobin decreased to 12.7.  Iron stores remain adequate with ferritin at 461 and transferrin saturation at 23%.    *Elevated serum creatinine.   Creatinine was 1.18 on 81/18/2022.  Patient increased her fluid intake.   Creatinine improved to 1.10 on 1/24/2023.  8/17/2023: Creatinine 1.13-attributed to the diuretic.      *Motion sickness.  Patient is going to be on a cruise.  I recommended scopolamine patch to be changed every 72 hours.    *History of vaginal bleeding secondary to uterine fibroids.    Patient reported to have enlargement of the uterus to a 20 week gestation size.    She was referred to gynecology oncology and surgery was recommended to be done ASAP.    They recommended having an IVC filter placed in preparation for the surgery.  Due to the timeframe from the diagnosis of a PE and since the patient was symptomatic in September 2020, the recommendation was to do radiosurgery ~6 months from the time of diagnosis of the PE (February 2021).  Patient decided in February 2021 that she did not want to have the surgery.  Patient reported in June 2022 that she was no longer having  vaginal bleeding.  Patient states that the bleeding has not recurred.  She is likely in menopause.  I recommended continued follow-up with gynecology to reevaluate the fibroids-expected to decrease in size after menopause.    PLAN:    1.  Continue Xarelto 20 mg daily.  I sent new prescription to her mail pharmacy.  2.  I recommended using the compression stockings daily if possible.  3.  We discussed maintaining adequate hydration, staying active and avoiding prolonged sitting especially on her upcoming travel.  4.  We discussed using a wedge to elevate legs while sleeping.  5.  Prescription was sent for scopolamine patch.  6.  She does not need IV iron therapy at this point.  7.  Follow-up in 6 months.  CBC CMP ferritin iron panel will be obtained.  She will contact us sooner if there are any changes.      Merly Giang MD  08/17/23

## 2023-09-20 DIAGNOSIS — I26.99 BILATERAL PULMONARY EMBOLISM: ICD-10-CM

## 2023-09-20 RX ORDER — RIVAROXABAN 20 MG/1
TABLET, FILM COATED ORAL
Qty: 90 TABLET | Refills: 3 | OUTPATIENT
Start: 2023-09-20

## 2023-10-10 ENCOUNTER — TELEPHONE (OUTPATIENT)
Dept: GASTROENTEROLOGY | Facility: CLINIC | Age: 58
End: 2023-10-10
Payer: COMMERCIAL

## 2023-10-10 NOTE — TELEPHONE ENCOUNTER
MOVED 1 YR FOLLOW UP TO 02/26 WITH PROVIDER ARSEN RODRIGUEZ     PRIOR APPT WAS MADE IN ERROR AT THE TIME   OK FOR THE HUB TO READ

## 2024-03-12 ENCOUNTER — TELEPHONE (OUTPATIENT)
Dept: GASTROENTEROLOGY | Facility: CLINIC | Age: 59
End: 2024-03-12
Payer: COMMERCIAL

## 2024-03-12 NOTE — TELEPHONE ENCOUNTER
"----- Message from Debra Flood MD sent at 3/12/2024  3:09 PM EDT -----  Regarding: FW: Hello, this is Kylie Bowie, I will be at [facility] group on Friday the 15th and was wondering  if I can get in to see you or the NP on that day as well  Contact: [phone]  Pls see if [facility] has an opening on the 15th - I am doing procedures that day  ----- Message -----  From: Loan Mathews RN  Sent: 3/11/2024  11:15 AM EDT  To: Debra Flood MD  Subject: FW: Mariaa, this is Kylie Bowie, I will be #      ----- Message -----  From: Kylie Bowie \"Holly\"  Sent: 3/10/2024  10:20 PM EDT  To: [facility]  Subject: Ifeomanancie, this is Kylie Bowie, I will be at     See if I can appt on the 15th since I will be doing Labs and seeing Dr. Kidd on that day as well ..  Please let me know, thank you  Kylie Bowie   "

## 2024-03-12 NOTE — TELEPHONE ENCOUNTER
I have called the pt and scheduled her to see modesto on 3/15   Maryann Ríos  : 1998 [id number] [facility]  [address] [place] [address]  [phone]   [phone]        OUTPATIENT PHYSICAL THERAPY:Daily Note 2018         ICD-10: Treatment Diagnosis: Stiffness of left ankle, not elsewhere classified (M25.672) , Pain in left ankle and joints of left foot (M25.572), Patellar tendinitis, left knee (M76.52)  Precautions/Allergies:   Patient has no allergy information on record. Fall Risk Score: 0 (? 5 = High Risk)  MD Orders: Eval and Treat  MEDICAL/REFERRING DIAGNOSIS:  Achilles tendinitis, left leg [M76.62]  Disorder of muscle, unspecified [M62.9]  Short Achilles tendon (acquired), left ankle [M67.02]   DATE OF ONSET: a few weeks ago   REFERRING PHYSICIAN: Roland Garland MD  RETURN PHYSICIAN APPOINTMENT: tbd      INITIAL ASSESSMENT:  Ms. Ricci Barber presents to therapy with pain in the L ankle due to achilles tendinitis from overuse and running. Pt is training for a marathon that is in 2 weeks and she started having tenderness and pain in the heel about 2 weeks ago. Pt is wearing a heel lift and an insert and has rested for about 2 weeks. Pt would benefit from skilled PT for above deficits to return to prior level of function painfree. Pt returned to therapy for the L knee pain due to following the race with tendinitis. Pt stated the ankle was feeling better but the knee was bothering her. PROBLEM LIST (Impacting functional limitations):  1. Decreased Strength  2. Decreased ADL/Functional Activities  3. Decreased Ambulation Ability/Technique  4. Decreased Balance  5. Increased Pain  6. Decreased Activity Tolerance  7. Decreased Flexibility/Joint Mobility INTERVENTIONS PLANNED:  1. Balance Exercise  2. Cold  3. Electrical Stimulation  4. Heat  5. Home Exercise Program (HEP)  6. Iontophoresis  7. Manual Therapy  8. Range of Motion (ROM)  9. Therapeutic Exercise/Strengthening  10.  Ultrasound (US)  11. [facility]    TREATMENT PLAN:  Effective Dates: 10-29-18 to 11/28/2018. Frequency/Duration: 2 times a week for 30 Days  GOALS: (Goals have been discussed and agreed upon with patient.)  Short-Term Functional Goals: Time Frame: 2 weeks   1. Ms. Clinton Umana to be independent with HEP. (MET)  2. Pt able to tolerate running progression without increase in pain in the L achilles. (MET)  3. Pt able to return to gym and running workouts prior to her running the marathon. (MET)  4. Pt able to tolerate stretching and therapy without increase in pain in the L knee. Discharge Goals: Time Frame: 4 weeks   1. Pt able to tolerate [place] marathon painfree in the L ankle. (MET)  2. Pt able to return to normal and regular running intervals painfree in the L ankle. (MET)  3. Pt able to return to running and gym activities painfree in the L knee. Rehabilitation Potential For Stated Goals: Excellent                HISTORY:   History of Present Injury/Illness (Reason for Referral):       Pt 25 y/o F with pain in the L achilles that started about 2 weeks ago while running into the 5th mile in a 10 mile run. Pt had a heel lift placed and has an insert for her shoes which she is running in. Pt is also taking a prednisone pack which has helped with the swelling and the pain. Pt has not been running the past 2 weeks due to the pain and has a marathon to run Oct. 7th in [facility]. Pt is tender to touch over the L achilles closer to the insert into the soleus vs. The calcaneus attachment site. Pt returned following her race in [facility] with patellar and quad tendinits pain in the L knee. Past Medical History/Comorbidities:   Ms. Clinton Umana  has no past medical history on file. Ms. Clinton Umana  has no past surgical history on file.   Social History/Living Environment:     Lives on campus   Prior Level of Function/Work/Activity:  Independent   Dominant Side:         RIGHT  Current Medications:  No current outpatient medications on file.   Date Last Reviewed:  11/20/2018   EXAMINATION:   Observation/Orthostatic Postural Assessment:          Good   Palpation:          Tenderness to touch over the achilles   ROM:     Pt has full ROM but is slightly tighter in the L ankle going into DF       Strength:     Strength is overall 4+/5 with the exception       Special Tests:          N/a   Neurological Screen:        Sensation: no complaint of numbness or tingling   Functional Mobility:         Independent   Balance:          Good    Body Structures Involved:  1. Joints  2. Muscles  3. Ligaments Body Functions Affected:  1. Movement Related Activities and Participation Affected:  1. Mobility   CLINICAL PRESENTATION:   CLINICAL DECISION MAKING:   Outcome Measure: Tool Used: VAS pain scale   Score:  Initial: 4/10  Most Recent: 5/10  (Date: 10-29-18 )   Interpretation of Score: 50% impaired   Outcome Measure Conversion Site    Medical Necessity:   · Patient is expected to demonstrate progress in strength, range of motion and balance to increase independence with functional activities painfree. .  Reason for Services/Other Comments:  · Patient continues to require present interventions due to patient's inability to perform functional activities painfree. .   TREATMENT:   (In addition to Assessment/Re-Assessment sessions the following treatments were rendered)  THERAPEUTIC EXERCISE: (see chart below for  minutes):  Exercises per grid below to improve mobility, strength and balance. Required minimal visual and verbal cues to promote proper body alignment, promote proper body posture and promote proper body mechanics. Progressed resistance, range and repetitions as indicated. MANUAL THERAPY: (see chart below for  minutes): Joint mobilization and Soft tissue mobilization was utilized and necessary because of the patient's restricted joint motion and restricted motion of soft tissue.    MODALITIES: (see chart below for  minutes):      see chart below for details on pain management.       Date: 9-26-18  (EVAL)  9-28-18  (Visit 2)  10-29-18  (Re-eval)  Visit 3 11-2-18  (Visit 4) 11-5-18  (Visit 5)  11-7-18  (visit 6)  11-12-18  (Visit 7)  11-14-18  (Visit 8)  11-20-18  (visit 9)    Modalities:  10 mins  8 mins  13 mins  13 mins        Ice masage     5 mins  5 mins        Cold whirlpool   5 mins following tx           Warm whirlpool   5 mins prior to tx           Phonophoresis    L knee 8 mins 50% duty cycle 0.8w/cm2 1MHz Repeat  Repeat        Therapeutic Exercise: 15 mins  15 mins  15 mins  30 mins  30 mins  30 mins  45 mins  45 mins  45 mins    4 way ankle with tband  x20 each blue band  Repeat           Soleus stretch  slantboard x 20SH           slantboard  x30SH Repeat       x60SH    HS stretch  2x15SH bilateral  Repeat    Repeat    With strap x30SH Repeat    Reverse walking   2L           Walking heel raises   2L           RDLs   2x10 red kettlebell   Walking with 8#   Static with rotation red kettlebell  Repeat   Repeat  Walking 2L with 6# with high knee and lunge  Repeat    Heel raises resisted   100# 2x10 double leg   100# eccentric SL          SL clamshells    2x20  Resisted side stepping blue band knees bent and straight 2L Repeat side stepping and repeat with clamshells and resistance band        Trey stretch    2x15SH          Fire hydrants and donkey kicks alternating    2x10   With green band x10 bilateral        Bridging with alternating kicks     2x10         Eccentric heel taps     6 in 3x10    Repeat      Reverse lunge     With sliders 2x10  Repeat   Repeat walking with sliders  Repeat  Repeat    Leg press         160# 2x10  Repeat 3x12   Bosu lateral leap         x10 bilateral   Single leg push off x 20 each  Repeat    Treadmill       Running on TM with progression protocol  Repeat      Modified SL chair squat         2 foam x10 bilateral  Split jump squat 2x10 bilateral    SLR with ER    3x10  Repeat         Manual Therapy:  20 mins  5 mins STM gastroc, soleus, achilles, plantar fascia   30 mins           STM over the quad and patellar tendon L knee    5 mins          Therapeutic Activities:            Bungee         Running x5  Running in place full extension 3x60 foot contacts  High knees and skjipping 2L    Adductor stretch with strap      2x15SH bilateral   Repeat   Repeat    Quad stretch with strap      Following US   Repeat   Repeat    X with band around ankle        3x5 with green band      Jump down and hop         x10 from 24 in block    Side stepping        With green band 2L  Repeat     HEP: Pt was given a blue tband and above exercises to do at home. [facility] Portal  Treatment/Session Assessment:  Pt is doing well and is not having any issues with pain or swelling. She is back to running up to 25-30 mins and was told to continue to progress to the next 2 phases and if she can reach the last phase of 45 mins running then she is discharged to [facility]. Pt is doing well and is going home for the holidays and will call if she needs to return once she is back at school. · Pain/ Symptoms: Initial:   0/10 \"IIm not having any pain and im running 30 mins at a 10 min mile. \"  Post Session:  No increase in pain following session. ·   Compliance with Program/Exercises: Will assess as treatment progresses. · Recommendations/Intent for next treatment session: \"Next visit will focus on advancements to more challenging activities\".   Total Treatment Duration:  PT Patient Time In/Time Out  Time In: 0930  Time Out: [address], PT, DPT

## 2024-03-15 ENCOUNTER — OFFICE VISIT (OUTPATIENT)
Dept: GASTROENTEROLOGY | Facility: CLINIC | Age: 59
End: 2024-03-15
Payer: COMMERCIAL

## 2024-03-15 ENCOUNTER — TELEPHONE (OUTPATIENT)
Dept: ONCOLOGY | Facility: CLINIC | Age: 59
End: 2024-03-15
Payer: COMMERCIAL

## 2024-03-15 ENCOUNTER — LAB (OUTPATIENT)
Dept: LAB | Facility: HOSPITAL | Age: 59
End: 2024-03-15
Payer: COMMERCIAL

## 2024-03-15 ENCOUNTER — OFFICE VISIT (OUTPATIENT)
Dept: ONCOLOGY | Facility: CLINIC | Age: 59
End: 2024-03-15
Payer: COMMERCIAL

## 2024-03-15 ENCOUNTER — PATIENT MESSAGE (OUTPATIENT)
Dept: ONCOLOGY | Facility: CLINIC | Age: 59
End: 2024-03-15
Payer: COMMERCIAL

## 2024-03-15 VITALS
RESPIRATION RATE: 16 BRPM | DIASTOLIC BLOOD PRESSURE: 65 MMHG | SYSTOLIC BLOOD PRESSURE: 105 MMHG | HEIGHT: 67 IN | HEART RATE: 86 BPM | WEIGHT: 234.3 LBS | TEMPERATURE: 96.6 F | OXYGEN SATURATION: 98 % | BODY MASS INDEX: 36.78 KG/M2

## 2024-03-15 VITALS
SYSTOLIC BLOOD PRESSURE: 118 MMHG | HEIGHT: 67 IN | DIASTOLIC BLOOD PRESSURE: 66 MMHG | BODY MASS INDEX: 36.78 KG/M2 | TEMPERATURE: 96 F | HEART RATE: 85 BPM | WEIGHT: 234.3 LBS | OXYGEN SATURATION: 95 %

## 2024-03-15 DIAGNOSIS — E87.6 HYPOKALEMIA: ICD-10-CM

## 2024-03-15 DIAGNOSIS — I82.512 CHRONIC DEEP VEIN THROMBOSIS (DVT) OF LEFT FEMORAL VEIN: ICD-10-CM

## 2024-03-15 DIAGNOSIS — D68.51 FACTOR V LEIDEN MUTATION: ICD-10-CM

## 2024-03-15 DIAGNOSIS — K21.9 GASTROESOPHAGEAL REFLUX DISEASE: ICD-10-CM

## 2024-03-15 DIAGNOSIS — D50.0 IRON DEFICIENCY ANEMIA DUE TO CHRONIC BLOOD LOSS: ICD-10-CM

## 2024-03-15 DIAGNOSIS — R79.89 ELEVATED SERUM CREATININE: ICD-10-CM

## 2024-03-15 DIAGNOSIS — K59.03 DRUG INDUCED CONSTIPATION: Primary | ICD-10-CM

## 2024-03-15 DIAGNOSIS — I26.99 BILATERAL PULMONARY EMBOLISM: ICD-10-CM

## 2024-03-15 DIAGNOSIS — I26.99 BILATERAL PULMONARY EMBOLISM: Primary | ICD-10-CM

## 2024-03-15 DIAGNOSIS — Z87.898 HISTORY OF MOTION SICKNESS: ICD-10-CM

## 2024-03-15 LAB
ALBUMIN SERPL-MCNC: 3.8 G/DL (ref 3.5–5.2)
ALBUMIN/GLOB SERPL: 1.2 G/DL
ALP SERPL-CCNC: 88 U/L (ref 39–117)
ALT SERPL W P-5'-P-CCNC: 29 U/L (ref 1–33)
ANION GAP SERPL CALCULATED.3IONS-SCNC: 9.3 MMOL/L (ref 5–15)
AST SERPL-CCNC: 32 U/L (ref 1–32)
BASOPHILS # BLD AUTO: 0.03 10*3/MM3 (ref 0–0.2)
BASOPHILS NFR BLD AUTO: 0.5 % (ref 0–1.5)
BILIRUB SERPL-MCNC: 0.5 MG/DL (ref 0–1.2)
BUN SERPL-MCNC: 15 MG/DL (ref 6–20)
BUN/CREAT SERPL: 14.6 (ref 7–25)
CALCIUM SPEC-SCNC: 9.1 MG/DL (ref 8.6–10.5)
CHLORIDE SERPL-SCNC: 98 MMOL/L (ref 98–107)
CO2 SERPL-SCNC: 28.7 MMOL/L (ref 22–29)
CREAT SERPL-MCNC: 1.03 MG/DL (ref 0.57–1)
DEPRECATED RDW RBC AUTO: 40.2 FL (ref 37–54)
EGFRCR SERPLBLD CKD-EPI 2021: 63.2 ML/MIN/1.73
EOSINOPHIL # BLD AUTO: 0.05 10*3/MM3 (ref 0–0.4)
EOSINOPHIL NFR BLD AUTO: 0.9 % (ref 0.3–6.2)
ERYTHROCYTE [DISTWIDTH] IN BLOOD BY AUTOMATED COUNT: 13.5 % (ref 12.3–15.4)
FERRITIN SERPL-MCNC: 477 NG/ML (ref 13–150)
GLOBULIN UR ELPH-MCNC: 3.2 GM/DL
GLUCOSE SERPL-MCNC: 116 MG/DL (ref 65–99)
HCT VFR BLD AUTO: 40.1 % (ref 34–46.6)
HGB BLD-MCNC: 13.6 G/DL (ref 12–15.9)
IMM GRANULOCYTES # BLD AUTO: 0.01 10*3/MM3 (ref 0–0.05)
IMM GRANULOCYTES NFR BLD AUTO: 0.2 % (ref 0–0.5)
IRON 24H UR-MRATE: 83 MCG/DL (ref 37–145)
IRON SATN MFR SERPL: 23 % (ref 20–50)
LYMPHOCYTES # BLD AUTO: 1.8 10*3/MM3 (ref 0.7–3.1)
LYMPHOCYTES NFR BLD AUTO: 33 % (ref 19.6–45.3)
MCH RBC QN AUTO: 27.9 PG (ref 26.6–33)
MCHC RBC AUTO-ENTMCNC: 33.9 G/DL (ref 31.5–35.7)
MCV RBC AUTO: 82.2 FL (ref 79–97)
MONOCYTES # BLD AUTO: 0.52 10*3/MM3 (ref 0.1–0.9)
MONOCYTES NFR BLD AUTO: 9.5 % (ref 5–12)
NEUTROPHILS NFR BLD AUTO: 3.05 10*3/MM3 (ref 1.7–7)
NEUTROPHILS NFR BLD AUTO: 55.9 % (ref 42.7–76)
NRBC BLD AUTO-RTO: 0 /100 WBC (ref 0–0.2)
PLATELET # BLD AUTO: 235 10*3/MM3 (ref 140–450)
PMV BLD AUTO: 10.5 FL (ref 6–12)
POTASSIUM SERPL-SCNC: 3.4 MMOL/L (ref 3.5–5.2)
PROT SERPL-MCNC: 7 G/DL (ref 6–8.5)
RBC # BLD AUTO: 4.88 10*6/MM3 (ref 3.77–5.28)
SODIUM SERPL-SCNC: 136 MMOL/L (ref 136–145)
TIBC SERPL-MCNC: 358 MCG/DL (ref 298–536)
TRANSFERRIN SERPL-MCNC: 240 MG/DL (ref 200–360)
WBC NRBC COR # BLD AUTO: 5.46 10*3/MM3 (ref 3.4–10.8)

## 2024-03-15 PROCEDURE — 83540 ASSAY OF IRON: CPT

## 2024-03-15 PROCEDURE — 36415 COLL VENOUS BLD VENIPUNCTURE: CPT

## 2024-03-15 PROCEDURE — 80053 COMPREHEN METABOLIC PANEL: CPT

## 2024-03-15 PROCEDURE — 84466 ASSAY OF TRANSFERRIN: CPT

## 2024-03-15 PROCEDURE — 85025 COMPLETE CBC W/AUTO DIFF WBC: CPT

## 2024-03-15 PROCEDURE — 82728 ASSAY OF FERRITIN: CPT

## 2024-03-15 RX ORDER — MECLIZINE HYDROCHLORIDE 25 MG/1
25 TABLET ORAL 3 TIMES DAILY PRN
Qty: 60 TABLET | Refills: 1 | Status: SHIPPED | OUTPATIENT
Start: 2024-03-15

## 2024-03-15 RX ORDER — HYALURONATE SODIUM, STABILIZED 60 MG/3 ML
SYRINGE (ML) INTRAARTICULAR
COMMUNITY
Start: 2024-02-16

## 2024-03-15 RX ORDER — OMEPRAZOLE 40 MG/1
40 CAPSULE, DELAYED RELEASE ORAL DAILY
Qty: 90 CAPSULE | Refills: 3 | Status: SHIPPED | OUTPATIENT
Start: 2024-03-15

## 2024-03-15 RX ORDER — SCOLOPAMINE TRANSDERMAL SYSTEM 1 MG/1
1 PATCH, EXTENDED RELEASE TRANSDERMAL
Qty: 10 PATCH | Refills: 0 | Status: SHIPPED | OUTPATIENT
Start: 2024-03-15

## 2024-03-15 NOTE — PROGRESS NOTES
"Chief Complaint  Gastroesophageal reflux disease    Subjective          History of Present Illness    Kylie Bowie is a  59 y.o. female presents for follow-up.  She has a history of GERD and iron deficiency anemia for which she follows with hematology.  She is a patient of Dr. Flood and is new to me.  She was last seen in the office 2/20/2023 by OLEKSANDR Mcmanus.     Doing well on omeprazole 40mg without breakthrough symptoms. If she forgets to take the medication she will have reflux with tomato sauce, spicy foods. No nausea, vomiting, black or bloody stools. Good appetite, no unintentional weight loss. She does report constipation with occasional diarrhea if she has not had a BM for several days - she says she hurt her knees recently and has been taking more norco which she believes is the cause of her constipation.     EGD 7/19/2023 showed small hiatal hernia, normal stomach, normal examined duodenum  Colonoscopy 7/19/2023 showed two 4 to 6 mm polyps in descending colon and cecum which were removed, one 7 mm polyp in the cecum which was removed, diverticulosis, nonbleeding internal hemorrhoids.  2 polyps showed adenomatous [id number] colonoscopy 5 years.    Objective   Vital Signs:   /66   Pulse 85   Temp 96 °F (35.6 °C)   Ht 170.2 cm (67\")   Wt 106 kg (234 lb 4.8 oz)   SpO2 95%   BMI 36.70 kg/m²       Physical Exam  Constitutional:       General: She is not in acute distress.     Appearance: Normal appearance.   Eyes:      General: No scleral icterus.  Cardiovascular:      Rate and Rhythm: Normal rate.   Pulmonary:      Effort: Pulmonary effort is normal.   Abdominal:      General: Abdomen is flat. Bowel sounds are normal. There is no distension.      Tenderness: There is no abdominal tenderness. There is no guarding.   Musculoskeletal:      Comments: Knee braces on both knees   Skin:     Coloration: Skin is not jaundiced.   Neurological:      General: No focal deficit present.      Mental " Status: She is alert and oriented to person, place, and time.   Psychiatric:         Mood and Affect: Mood normal.         Behavior: Behavior normal.          Result Review :   The following data was reviewed by: Glenis Wilburn PA-C on 03/15/2024:  CMP          8/17/2023    12:12   CMP   Glucose 114    BUN 14    Creatinine 1.13    EGFR 56.9    Sodium 143    Potassium 3.7    Chloride 103    Calcium 8.7    Total Protein 6.9    Albumin 3.9    Globulin 3.0    Total Bilirubin 0.5    Alkaline Phosphatase 89    AST (SGOT) 41    ALT (SGPT) 24    Albumin/Globulin Ratio 1.3    BUN/Creatinine Ratio 12.4    Anion Gap 12.0      CBC          8/17/2023    12:12   CBC   WBC 6.27    RBC 4.56    Hemoglobin 12.7    Hematocrit 38.4    MCV 84.2    MCH 27.9    MCHC 33.1    RDW 13.9    Platelets 239              Assessment:   Diagnoses and all orders for this visit:    1. Drug induced constipation (Primary)    2. Gastroesophageal reflux disease  -     omeprazole (priLOSEC) 40 MG capsule; Take 1 capsule by mouth Daily.  Dispense: 90 capsule; Refill: 3          Plan:   Recommend continuing on omeprazole 40mg daily  For constipation encouraged daily fiber supplementation and miralax as needed. Can consider Amitiza or similar medication in the future if needed. Advised pt to avoid constipating medications such as norco as much as possible.   Follow-up colonoscopy due 2028  Getting CMP checked with oncology       Follow Up   No follow-ups on file.    Dragon dictation used throughout this note.         Glenis Wilburn PA-C  [facility]  [address] [place] [address]  Office: [phone]

## 2024-03-15 NOTE — PROGRESS NOTES
Subjective     CHIEF COMPLAINT:      Chief Complaint   Patient presents with    Follow-up     No concerns    Med Refill     Xarelto      HISTORY OF PRESENT ILLNESS:     Kylie Bowie is a 59 y.o. female patient who returns today for follow up on her factor V Leiden mutation and thrombosis.  She is on Xarelto 20 mg daily previously she is tolerating it without problem with bleeding or bruising.  She has arthritis in both knees.  She received injections into the joints but did not notice improvement.  She reports intermittent pain in the left ankle.  No pain in the calf, however.    Patient has motion sickness.  She was previously prescribed scopolamine which has helped.  She is going on a cruise in the very near future.    ROS:  Pertinent ROS is in the HPI.     Past medical, surgical, social and family history were reviewed.     MEDICATIONS:    Current Outpatient Medications:     albuterol (PROVENTIL HFA;VENTOLIN HFA) 108 (90 BASE) MCG/ACT inhaler, Inhale 2 puffs Every 4 (Four) Hours As Needed for Wheezing., Disp: , Rfl:     atorvastatin (LIPITOR) 20 MG tablet, Take 1 tablet by mouth Daily., Disp: , Rfl:     chlorthalidone (HYGROTON) 25 MG tablet, Take 1 tablet by mouth Daily., Disp: , Rfl:     Durolane 60 MG/3ML prefilled syringe injection, , Disp: , Rfl:     fluticasone (FLONASE) 50 MCG/ACT nasal spray, 2 sprays into the nostril(s) as directed by provider Daily. Administer 2 sprays in each nostril for each dose., Disp: 3 bottle, Rfl: 1    gabapentin (NEURONTIN) 300 MG capsule, Take 1 capsule by mouth At Night As Needed., Disp: , Rfl:     HYDROcodone-acetaminophen (NORCO) 5-325 MG per tablet, Take 1 tablet by mouth 3 (Three) Times a Day. OA, Disp: , Rfl:     lisinopril (PRINIVIL,ZESTRIL) 20 MG tablet, Take 1 tablet by mouth Daily., Disp: , Rfl:     montelukast (SINGULAIR) 10 MG tablet, Take 1 tablet by mouth Every Night., Disp: 90 tablet, Rfl: 1    omeprazole (priLOSEC) 40 MG capsule, Take 1 capsule by mouth  "Daily., Disp: 90 capsule, Rfl: 3    rivaroxaban (Xarelto) 20 MG tablet, Take 1 tablet by mouth Daily., Disp: 90 tablet, Rfl: 3    Scopolamine 1 MG/3DAYS patch, Place 1 patch on the skin as directed by provider Every 72 (Seventy-Two) Hours., Disp: 4 patch, Rfl: 0    Objective     VITAL SIGNS:     Vitals:    03/15/24 1119   BP: 105/65   Pulse: 86   Resp: 16   Temp: 96.6 °F (35.9 °C)   TempSrc: Temporal   SpO2: 98%   Weight: 106 kg (234 lb 4.8 oz)   Height: 170.2 cm (67.01\")   PainSc:   8   PainLoc: Knee  Comment: lt     Body mass index is 36.69 kg/m².     Wt Readings from Last 5 Encounters:   03/15/24 106 kg (234 lb 4.8 oz)   03/15/24 106 kg (234 lb 4.8 oz)   08/17/23 106 kg (232 lb 9.6 oz)   07/19/23 104 kg (229 lb)   02/20/23 106 kg (233 lb 3.2 oz)     PHYSICAL EXAMINATION:   GENERAL: The patient appears in good general condition, not in acute distress.   SKIN: No ecchymosis.  EYES: No jaundice. No Pallor.  CHEST: Normal respiratory effort.  Lungs clear bilaterally.  No added sounds.  CVS: Normal S1-S2.  No murmurs.  ABDOMEN: Nondistended.  EXTREMITIES: No calf tenderness.  Left knee is in a brace.    DIAGNOSTIC DATA:     Results from last 7 days   Lab Units 03/15/24  1114   WBC 10*3/mm3 5.46   NEUTROS ABS 10*3/mm3 3.05   HEMOGLOBIN g/dL 13.6   HEMATOCRIT % 40.1   PLATELETS 10*3/mm3 235     Results from last 7 days   Lab Units 03/15/24  1114   SODIUM mmol/L 136   POTASSIUM mmol/L 3.4*   CHLORIDE mmol/L 98   CO2 mmol/L 28.7   BUN mg/dL 15   CREATININE mg/dL 1.03*   CALCIUM mg/dL 9.1   ALBUMIN g/dL 3.8   BILIRUBIN mg/dL 0.5   ALK PHOS U/L 88   ALT (SGPT) U/L 29   AST (SGOT) U/L 32   GLUCOSE mg/dL 116*         Lab 03/15/24  1114   IRON 83   IRON SATURATION (TSAT) 23   TIBC 358   TRANSFERRIN 240   FERRITIN 477.00*      Assessment & Plan    *Factor V Leiden heterozygous mutation.  She has a history of left lower extremity DVT that developed after a myomectomy, D&C in 1999.   She had a foot surgery and was placed on " Xarelto 10 mg daily after the surgery.   She was diagnosed with acute PE and left lower extremity DVT on 8/19/2020.  There was no right heart strain.  Acute DVT was identified in the left femoral, popliteal, posterior tibial, peroneal and gastrocnemius/soleal veins.  She was hospitalized at [facility] on 8/19/2020 and anticoagulated with Lovenox.    VQ scan on 9/9/2020 showed no perfusion defects.    Venous Doppler on 9/9/2020 showed the thrombosis to have become chronic.  Based on recurrent thrombosis and the unprovoked acute DVT and PE in August 2020, lifelong anticoagulation was recommended.  CT angiogram on 2/15/2021 revealed resolution of the pulmonary embolism.  Venous doppler on 2/15/2021 revealed a chronic thrombus in the distal femoral and popliteal veins.  Venous Doppler on 8/20/2021 revealed chronic thrombus in the distal femoral, popliteal and gastrocnemius veins.  Bridging with Lovenox 30 mg every 12 hours immediately after surgery was recommended if she requires future orthopedic surgeries.  Due to her increased risk for postoperative thrombosis, her Orthopedic surgeon did not recommend surgery.  Venous Doppler on 1/16/2023 revealed chronic DVT in the distal femoral and popliteal veins.   She continues anticoagulation with Xarelto 20 mg daily.   She is not having problem with bleeding or bruising.  No symptoms or signs of venous thrombosis.    *Anemia secondary to iron deficiency in a patient with Beta thalassemia trait.   She was placed on ferrous sulfate 325 mg a day on 4/25/2019.  She took the ferrous sulfate for 6 months.    Hemoglobin was normal at 13.2 on 7/30/2020.    Hemoglobin dropped to 11.7 on 9/22/2020.  Patient was started on ferrous sulfate 325 mg daily on 9/22/2020.  She did not respond to tolerate the oral iron which was eventually discontinued.  Patient was given IV Venofer x2 doses on 2/26/2021 and 3/12/2021.  Hemoglobin was 13.3 on 12/13/2021 with a transferrin saturation of 25%  and ferritin of 567.  Hemoglobin was 12.6 on 6/6/2022.  Iron stores were adequate with ferritin of 286 and transferrin saturation 19%.  1/24/2023: Hemoglobin 14.1.  Iron stores adequate.  8/17/2023: Hemoglobin decreased to 12.7.  Iron stores remain adequate with ferritin at 461 and transferrin saturation at 23%.  3/15/2024: Hemoglobin 13.6.    Iron stores adequate with ferritin at 477 and transferrin saturation at 23%.    *Elevated serum creatinine.   Creatinine was 1.18 on 81/18/2022.  Patient increased her fluid intake.   Creatinine improved to 1.10 on 1/24/2023.  8/17/2023: Creatinine 1.13-attributed to the diuretic.    3/15/2024: Creatinine improved to 1.03.  Potassium decreased to 3.4.    *Motion sickness.  She is going on a cruise soon.  I recommended scopolamine patch  I recommended scopolamine patch to be changed every 72 hours.  I also recommended Meclizine 25 mg every 8 hours as needed.    *History of vaginal bleeding secondary to uterine fibroids.    Patient reported to have enlargement of the uterus to a 20 week gestation size.    She was referred to gynecology oncology and surgery was recommended to be done ASAP.    They recommended having an IVC filter placed in preparation for the surgery.  Due to the timeframe from the diagnosis of a PE and since the patient was symptomatic in September 2020, the recommendation was to do radiosurgery ~6 months from the time of diagnosis of the PE (February 2021).  Patient decided in February 2021 that she did not want to have the surgery.  Patient reported in June 2022 that she was no longer having vaginal bleeding.  Patient states that the bleeding has not recurred.  She is likely in menopause.  I recommended continued follow-up with gynecology to reevaluate the fibroids-expected to decrease in size after menopause.    PLAN:    1.  Continue Xarelto 20 mg daily.    2.  Increase intake of potassium rich foods.  3.  Continue to use compression stockings.  4.  I sent  the prescription for scopolamine patch to be changed every 3 days and for me to using 25 mg every 8 hours as needed for dizziness/vertigo.  5.  Follow-up in 6 months.  Will obtain CBC CMP ferritin iron panel.        Merly Giang MD  03/15/24

## 2024-03-15 NOTE — TELEPHONE ENCOUNTER
----- Message from Merly Giang MD sent at 3/15/2024  1:06 PM EDT -----  Please inform the patient that her iron stores are adequate.  She does not need IV iron therapy at this time.    Kidney function improved compared to previous lab (creatinine 1.03).  However, potassium decreased at 3.4.  Please ask her to increase intake of potassium rich food.    Thank you

## 2024-04-26 RX ORDER — SCOLOPAMINE TRANSDERMAL SYSTEM 1 MG/1
1 PATCH, EXTENDED RELEASE TRANSDERMAL
Qty: 10 PATCH | Refills: 0 | Status: SHIPPED | OUTPATIENT
Start: 2024-04-26

## 2024-10-04 ENCOUNTER — OFFICE VISIT (OUTPATIENT)
Dept: ONCOLOGY | Facility: CLINIC | Age: 59
End: 2024-10-04
Payer: COMMERCIAL

## 2024-10-04 ENCOUNTER — TELEPHONE (OUTPATIENT)
Dept: ONCOLOGY | Facility: CLINIC | Age: 59
End: 2024-10-04
Payer: COMMERCIAL

## 2024-10-04 ENCOUNTER — LAB (OUTPATIENT)
Dept: LAB | Facility: HOSPITAL | Age: 59
End: 2024-10-04
Payer: COMMERCIAL

## 2024-10-04 VITALS
HEART RATE: 82 BPM | OXYGEN SATURATION: 100 % | SYSTOLIC BLOOD PRESSURE: 107 MMHG | BODY MASS INDEX: 36.74 KG/M2 | DIASTOLIC BLOOD PRESSURE: 57 MMHG | WEIGHT: 234.1 LBS | RESPIRATION RATE: 18 BRPM | TEMPERATURE: 98.4 F | HEIGHT: 67 IN

## 2024-10-04 DIAGNOSIS — I82.512 CHRONIC DEEP VEIN THROMBOSIS (DVT) OF LEFT FEMORAL VEIN: ICD-10-CM

## 2024-10-04 DIAGNOSIS — D50.0 IRON DEFICIENCY ANEMIA DUE TO CHRONIC BLOOD LOSS: ICD-10-CM

## 2024-10-04 DIAGNOSIS — I26.99 BILATERAL PULMONARY EMBOLISM: Primary | ICD-10-CM

## 2024-10-04 DIAGNOSIS — D68.51 FACTOR V LEIDEN MUTATION: ICD-10-CM

## 2024-10-04 DIAGNOSIS — I26.99 BILATERAL PULMONARY EMBOLISM: ICD-10-CM

## 2024-10-04 LAB
ALBUMIN SERPL-MCNC: 3.9 G/DL (ref 3.5–5.2)
ALBUMIN/GLOB SERPL: 1.3 G/DL
ALP SERPL-CCNC: 86 U/L (ref 39–117)
ALT SERPL W P-5'-P-CCNC: 10 U/L (ref 1–33)
ANION GAP SERPL CALCULATED.3IONS-SCNC: 10.9 MMOL/L (ref 5–15)
AST SERPL-CCNC: 19 U/L (ref 1–32)
BASOPHILS # BLD AUTO: 0.04 10*3/MM3 (ref 0–0.2)
BASOPHILS NFR BLD AUTO: 0.6 % (ref 0–1.5)
BILIRUB SERPL-MCNC: 0.5 MG/DL (ref 0–1.2)
BUN SERPL-MCNC: 17 MG/DL (ref 6–20)
BUN/CREAT SERPL: 14.9 (ref 7–25)
CALCIUM SPEC-SCNC: 8.6 MG/DL (ref 8.6–10.5)
CHLORIDE SERPL-SCNC: 101 MMOL/L (ref 98–107)
CO2 SERPL-SCNC: 28.1 MMOL/L (ref 22–29)
CREAT SERPL-MCNC: 1.14 MG/DL (ref 0.57–1)
DEPRECATED RDW RBC AUTO: 41.8 FL (ref 37–54)
EGFRCR SERPLBLD CKD-EPI 2021: 55.9 ML/MIN/1.73
EOSINOPHIL # BLD AUTO: 0.07 10*3/MM3 (ref 0–0.4)
EOSINOPHIL NFR BLD AUTO: 1 % (ref 0.3–6.2)
ERYTHROCYTE [DISTWIDTH] IN BLOOD BY AUTOMATED COUNT: 13.4 % (ref 12.3–15.4)
FERRITIN SERPL-MCNC: 389 NG/ML (ref 13–150)
GLOBULIN UR ELPH-MCNC: 3.1 GM/DL
GLUCOSE SERPL-MCNC: 136 MG/DL (ref 65–99)
HCT VFR BLD AUTO: 42 % (ref 34–46.6)
HGB BLD-MCNC: 13.8 G/DL (ref 12–15.9)
IMM GRANULOCYTES # BLD AUTO: 0.02 10*3/MM3 (ref 0–0.05)
IMM GRANULOCYTES NFR BLD AUTO: 0.3 % (ref 0–0.5)
IRON 24H UR-MRATE: 108 MCG/DL (ref 37–145)
IRON SATN MFR SERPL: 30 % (ref 20–50)
LYMPHOCYTES # BLD AUTO: 2.14 10*3/MM3 (ref 0.7–3.1)
LYMPHOCYTES NFR BLD AUTO: 30.5 % (ref 19.6–45.3)
MCH RBC QN AUTO: 27.9 PG (ref 26.6–33)
MCHC RBC AUTO-ENTMCNC: 32.9 G/DL (ref 31.5–35.7)
MCV RBC AUTO: 84.8 FL (ref 79–97)
MONOCYTES # BLD AUTO: 0.53 10*3/MM3 (ref 0.1–0.9)
MONOCYTES NFR BLD AUTO: 7.6 % (ref 5–12)
NEUTROPHILS NFR BLD AUTO: 4.21 10*3/MM3 (ref 1.7–7)
NEUTROPHILS NFR BLD AUTO: 60 % (ref 42.7–76)
NRBC BLD AUTO-RTO: 0 /100 WBC (ref 0–0.2)
PLATELET # BLD AUTO: 236 10*3/MM3 (ref 140–450)
PMV BLD AUTO: 10.4 FL (ref 6–12)
POTASSIUM SERPL-SCNC: 3.5 MMOL/L (ref 3.5–5.2)
PROT SERPL-MCNC: 7 G/DL (ref 6–8.5)
RBC # BLD AUTO: 4.95 10*6/MM3 (ref 3.77–5.28)
SODIUM SERPL-SCNC: 140 MMOL/L (ref 136–145)
TIBC SERPL-MCNC: 356 MCG/DL (ref 298–536)
TRANSFERRIN SERPL-MCNC: 239 MG/DL (ref 200–360)
WBC NRBC COR # BLD AUTO: 7.01 10*3/MM3 (ref 3.4–10.8)

## 2024-10-04 PROCEDURE — 82728 ASSAY OF FERRITIN: CPT

## 2024-10-04 PROCEDURE — 36415 COLL VENOUS BLD VENIPUNCTURE: CPT

## 2024-10-04 PROCEDURE — 83540 ASSAY OF IRON: CPT

## 2024-10-04 PROCEDURE — 84466 ASSAY OF TRANSFERRIN: CPT

## 2024-10-04 PROCEDURE — 85025 COMPLETE CBC W/AUTO DIFF WBC: CPT

## 2024-10-04 PROCEDURE — 80053 COMPREHEN METABOLIC PANEL: CPT

## 2024-10-04 RX ORDER — SCOLOPAMINE TRANSDERMAL SYSTEM 1 MG/1
1 PATCH, EXTENDED RELEASE TRANSDERMAL
Qty: 4 PATCH | Refills: 0 | Status: SHIPPED | OUTPATIENT
Start: 2024-10-04

## 2024-10-04 NOTE — PROGRESS NOTES
Subjective     CHIEF COMPLAINT:      Chief Complaint   Patient presents with    Follow-up     Lab review  SOB due to asthma     HISTORY OF PRESENT ILLNESS:     Kylie Bowie is a 59 y.o. female patient who returns today for follow up on her factor V Leiden mutation and thrombosis.  She is on Xarelto 20 mg daily previously she is tolerating it without problem with bleeding or bruising.     She has intermittent lower extremity swelling bilaterally which is unchanged from her baseline.  This does wax and wane based on her activity.  She does wear compression stockings when she travels and more through the wintertime.  She does have some shortness of breath exertionally though attributes this to her asthma and it is overall unchanged.      ROS:  Pertinent ROS is in the HPI.     Past medical, surgical, social and family history were reviewed.     MEDICATIONS:    Current Outpatient Medications:     albuterol (PROVENTIL HFA;VENTOLIN HFA) 108 (90 BASE) MCG/ACT inhaler, Inhale 2 puffs Every 4 (Four) Hours As Needed for Wheezing., Disp: , Rfl:     atorvastatin (LIPITOR) 20 MG tablet, Take 1 tablet by mouth Daily., Disp: , Rfl:     chlorthalidone (HYGROTON) 25 MG tablet, Take 1 tablet by mouth Daily., Disp: , Rfl:     Durolane 60 MG/3ML prefilled syringe injection, , Disp: , Rfl:     fluticasone (FLONASE) 50 MCG/ACT nasal spray, 2 sprays into the nostril(s) as directed by provider Daily. Administer 2 sprays in each nostril for each dose., Disp: 3 bottle, Rfl: 1    HYDROcodone-acetaminophen (NORCO) 5-325 MG per tablet, Take 1 tablet by mouth 3 (Three) Times a Day. OA, Disp: , Rfl:     lisinopril (PRINIVIL,ZESTRIL) 20 MG tablet, Take 1 tablet by mouth Daily., Disp: , Rfl:     meclizine (ANTIVERT) 25 MG tablet, Take 1 tablet by mouth 3 (Three) Times a Day As Needed for Dizziness., Disp: 60 tablet, Rfl: 1    montelukast (SINGULAIR) 10 MG tablet, Take 1 tablet by mouth Every Night., Disp: 90 tablet, Rfl: 1    omeprazole  "(priLOSEC) 40 MG capsule, Take 1 capsule by mouth Daily., Disp: 90 capsule, Rfl: 3    rivaroxaban (Xarelto) 20 MG tablet, Take 1 tablet by mouth Daily., Disp: 90 tablet, Rfl: 3    Scopolamine 1 MG/3DAYS patch, Place 1 patch on the skin as directed by provider Every 72 (Seventy-Two) Hours., Disp: 4 patch, Rfl: 0    gabapentin (NEURONTIN) 300 MG capsule, Take 1 capsule by mouth At Night As Needed. (Patient not taking: Reported on 10/4/2024), Disp: , Rfl:     Objective     VITAL SIGNS:     Vitals:    10/04/24 1041   BP: 107/57   Pulse: 82   Resp: 18   Temp: 98.4 °F (36.9 °C)   TempSrc: Oral   SpO2: 100%   Weight: 106 kg (234 lb 1.6 oz)   Height: 170.2 cm (67.01\")   PainSc: 6  Comment: legs   PainLoc: Hip       Body mass index is 36.66 kg/m².     Wt Readings from Last 5 Encounters:   10/04/24 106 kg (234 lb 1.6 oz)   03/15/24 106 kg (234 lb 4.8 oz)   03/15/24 106 kg (234 lb 4.8 oz)   08/17/23 106 kg (232 lb 9.6 oz)   07/19/23 104 kg (229 lb)     PHYSICAL EXAMINATION:   GENERAL: The patient appears in good general condition, not in acute distress.   SKIN: No ecchymosis.  EYES: No jaundice. No Pallor.  CHEST: Normal respiratory effort.  Lungs clear bilaterally.  No added sounds.  CVS: Normal S1-S2.  No murmurs.  ABDOMEN: Nondistended.  EXTREMITIES: No calf tenderness.  Without extremity swelling on exam today    DIAGNOSTIC DATA:     Results from last 7 days   Lab Units 10/04/24  1032   WBC 10*3/mm3 7.01   NEUTROS ABS 10*3/mm3 4.21   HEMOGLOBIN g/dL 13.8   HEMATOCRIT % 42.0   PLATELETS 10*3/mm3 236     Results from last 7 days   Lab Units 10/04/24  1032   SODIUM mmol/L 140   POTASSIUM mmol/L 3.5   CHLORIDE mmol/L 101   CO2 mmol/L 28.1   BUN mg/dL 17   CREATININE mg/dL 1.14*   CALCIUM mg/dL 8.6   ALBUMIN g/dL 3.9   BILIRUBIN mg/dL 0.5   ALK PHOS U/L 86   ALT (SGPT) U/L 10   AST (SGOT) U/L 19   GLUCOSE mg/dL 136*           Lab 10/04/24  1032   IRON 108   IRON SATURATION (TSAT) 30   TIBC 356   TRANSFERRIN 239   FERRITIN " 389.00*        Assessment & Plan    *Factor V Leiden heterozygous mutation.  She has a history of left lower extremity DVT that developed after a myomectomy, D&C in 1999.   She had a foot surgery and was placed on Xarelto 10 mg daily after the surgery.   She was diagnosed with acute PE and left lower extremity DVT on 8/19/2020.  There was no right heart strain.  Acute DVT was identified in the left femoral, popliteal, posterior tibial, peroneal and gastrocnemius/soleal veins.  She was hospitalized at [facility] on 8/19/2020 and anticoagulated with Lovenox.    VQ scan on 9/9/2020 showed no perfusion defects.    Venous Doppler on 9/9/2020 showed the thrombosis to have become chronic.  Based on recurrent thrombosis and the unprovoked acute DVT and PE in August 2020, lifelong anticoagulation was recommended.  CT angiogram on 2/15/2021 revealed resolution of the pulmonary embolism.  Venous doppler on 2/15/2021 revealed a chronic thrombus in the distal femoral and popliteal veins.  Venous Doppler on 8/20/2021 revealed chronic thrombus in the distal femoral, popliteal and gastrocnemius veins.  Bridging with Lovenox 30 mg every 12 hours immediately after surgery was recommended if she requires future orthopedic surgeries.  Due to her increased risk for postoperative thrombosis, her Orthopedic surgeon did not recommend surgery.  Venous Doppler on 1/16/2023 revealed chronic DVT in the distal femoral and popliteal veins.   10/4/2024 continuing on anticoagulation with Xarelto 20 mg daily with excellent tolerance, no signs or symptoms of bleeding, no signs or symptoms of thrombosis    *Anemia secondary to iron deficiency in a patient with Beta thalassemia trait.   She was placed on ferrous sulfate 325 mg a day on 4/25/2019.  She took the ferrous sulfate for 6 months.    Hemoglobin was normal at 13.2 on 7/30/2020.    Hemoglobin dropped to 11.7 on 9/22/2020.  Patient was started on ferrous sulfate 325 mg daily on  9/22/2020.  She did not respond to tolerate the oral iron which was eventually discontinued.  Patient was given IV Venofer x2 doses on 2/26/2021 and 3/12/2021.  Hemoglobin was 13.3 on 12/13/2021 with a transferrin saturation of 25% and ferritin of 567.  Hemoglobin was 12.6 on 6/6/2022.  Iron stores were adequate with ferritin of 286 and transferrin saturation 19%.  1/24/2023: Hemoglobin 14.1.  Iron stores adequate.  8/17/2023: Hemoglobin decreased to 12.7.  Iron stores remain adequate with ferritin at 461 and transferrin saturation at 23%.  3/15/2024: Hemoglobin 13.6.    Iron stores adequate with ferritin at 477 and transferrin saturation at 23%.  10/4/2024 she is without iron deficiency with hemoglobin normal at 13.8, ferritin 389, iron saturation 30%    *Elevated serum creatinine.   Creatinine was 1.18 on 81/18/2022.  Patient increased her fluid intake.   Creatinine improved to 1.10 on 1/24/2023.  8/17/2023: Creatinine 1.13-attributed to the diuretic.    3/15/2024: Creatinine improved to 1.03.  10/4/2024 creatinine is at baseline at 1.14 with potassium normal at 3.5    *Motion sickness.  She is going on a cruise soon.  Dr. Giang recommended scopolamine patch to be changed every 72 hours.  This was refilled today  I also recommended Meclizine 25 mg every 8 hours as needed.    *History of vaginal bleeding secondary to uterine fibroids.    Patient reported to have enlargement of the uterus to a 20 week gestation size.    She was referred to gynecology oncology and surgery was recommended to be done ASAP.    They recommended having an IVC filter placed in preparation for the surgery.  Due to the timeframe from the diagnosis of a PE and since the patient was symptomatic in September 2020, the recommendation was to do radiosurgery ~6 months from the time of diagnosis of the PE (February 2021).  Patient decided in February 2021 that she did not want to have the surgery.  Patient reported in June 2022 that she was no  longer having vaginal bleeding.  Patient states that the bleeding has not recurred.  She is likely in menopause.  I recommended continued follow-up with gynecology to reevaluate the fibroids-expected to decrease in size after menopause.    PLAN:    Continue Xarelto 20 mg daily  Scopolamine patch to be changed every 72 hours refilled today  We reviewed signs and symptoms of bleeding and signs and symptoms of thrombosis for which to monitor for  The patient was encouraged to continue compliance with compression stockings  6-month follow-up with Dr. Giang with CBC, ferritin, iron profile and CMP    Candice Patel, APRN  10/04/24

## 2024-10-04 NOTE — TELEPHONE ENCOUNTER
----- Message from Candice Patel sent at 10/4/2024 11:37 AM EDT -----  Regarding: iron labs  Will you please call this patient and let her know her iron labs look great, she does not need IV iron at this time.    Thanks    Candice

## 2025-01-01 DIAGNOSIS — K21.9 GASTROESOPHAGEAL REFLUX DISEASE: ICD-10-CM

## 2025-01-02 RX ORDER — OMEPRAZOLE 40 MG/1
40 CAPSULE, DELAYED RELEASE ORAL DAILY
Qty: 90 CAPSULE | Refills: 0 | Status: SHIPPED | OUTPATIENT
Start: 2025-01-02

## 2025-03-19 DIAGNOSIS — K21.9 GASTROESOPHAGEAL REFLUX DISEASE: ICD-10-CM

## 2025-03-20 RX ORDER — OMEPRAZOLE 40 MG/1
40 CAPSULE, DELAYED RELEASE ORAL DAILY
Qty: 30 CAPSULE | Refills: 0 | Status: SHIPPED | OUTPATIENT
Start: 2025-03-20

## 2025-04-24 DIAGNOSIS — K21.9 GASTROESOPHAGEAL REFLUX DISEASE: ICD-10-CM

## 2025-04-25 RX ORDER — OMEPRAZOLE 40 MG/1
40 CAPSULE, DELAYED RELEASE ORAL DAILY
Qty: 30 CAPSULE | Refills: 11 | Status: SHIPPED | OUTPATIENT
Start: 2025-04-25

## 2025-05-01 DIAGNOSIS — D68.51 FACTOR V LEIDEN MUTATION: ICD-10-CM

## 2025-05-01 DIAGNOSIS — I26.99 BILATERAL PULMONARY EMBOLISM: Primary | ICD-10-CM

## 2025-05-01 DIAGNOSIS — I82.512 CHRONIC DEEP VEIN THROMBOSIS (DVT) OF LEFT FEMORAL VEIN: ICD-10-CM

## 2025-05-01 DIAGNOSIS — D50.0 IRON DEFICIENCY ANEMIA DUE TO CHRONIC BLOOD LOSS: ICD-10-CM

## 2025-05-05 ENCOUNTER — HOSPITAL ENCOUNTER (OUTPATIENT)
Dept: CARDIOLOGY | Facility: HOSPITAL | Age: 60
Discharge: HOME | End: 2025-05-05
Admitting: NURSE PRACTITIONER
Payer: COMMERCIAL

## 2025-05-05 ENCOUNTER — LAB (OUTPATIENT)
Dept: LAB | Facility: HOSPITAL | Age: 60
End: 2025-05-05
Payer: COMMERCIAL

## 2025-05-05 ENCOUNTER — OFFICE VISIT (OUTPATIENT)
Dept: ONCOLOGY | Facility: CLINIC | Age: 60
End: 2025-05-05
Payer: COMMERCIAL

## 2025-05-05 VITALS
HEART RATE: 81 BPM | DIASTOLIC BLOOD PRESSURE: 73 MMHG | TEMPERATURE: 98.7 F | WEIGHT: 242 LBS | OXYGEN SATURATION: 98 % | SYSTOLIC BLOOD PRESSURE: 108 MMHG | RESPIRATION RATE: 16 BRPM | HEIGHT: 67 IN | BODY MASS INDEX: 37.98 KG/M2

## 2025-05-05 DIAGNOSIS — I26.99 BILATERAL PULMONARY EMBOLISM: Primary | ICD-10-CM

## 2025-05-05 DIAGNOSIS — D50.0 IRON DEFICIENCY ANEMIA DUE TO CHRONIC BLOOD LOSS: ICD-10-CM

## 2025-05-05 DIAGNOSIS — I26.99 BILATERAL PULMONARY EMBOLISM: ICD-10-CM

## 2025-05-05 DIAGNOSIS — I82.512 CHRONIC DEEP VEIN THROMBOSIS (DVT) OF LEFT FEMORAL VEIN: ICD-10-CM

## 2025-05-05 DIAGNOSIS — D68.51 FACTOR V LEIDEN MUTATION: ICD-10-CM

## 2025-05-05 LAB
ALBUMIN SERPL-MCNC: 4.1 G/DL (ref 3.5–5.2)
ALBUMIN/GLOB SERPL: 1.3 G/DL
ALP SERPL-CCNC: 98 U/L (ref 39–117)
ALT SERPL W P-5'-P-CCNC: 17 U/L (ref 1–33)
ANION GAP SERPL CALCULATED.3IONS-SCNC: 10.8 MMOL/L (ref 5–15)
AST SERPL-CCNC: 19 U/L (ref 1–32)
BASOPHILS # BLD AUTO: 0.05 10*3/MM3 (ref 0–0.2)
BASOPHILS NFR BLD AUTO: 0.7 % (ref 0–1.5)
BH CV LOW VAS LEFT POPLITEAL SPONT: 1
BH CV LOW VAS RIGHT LESSER SAPH VESSEL: 1
BH CV LOWER VASCULAR LEFT COMMON FEMORAL AUGMENT: NORMAL
BH CV LOWER VASCULAR LEFT COMMON FEMORAL COMPETENT: NORMAL
BH CV LOWER VASCULAR LEFT COMMON FEMORAL COMPRESS: NORMAL
BH CV LOWER VASCULAR LEFT COMMON FEMORAL PHASIC: NORMAL
BH CV LOWER VASCULAR LEFT COMMON FEMORAL SPONT: NORMAL
BH CV LOWER VASCULAR LEFT DISTAL FEMORAL COMPRESS: NORMAL
BH CV LOWER VASCULAR LEFT GASTRONEMIUS COMPRESS: NORMAL
BH CV LOWER VASCULAR LEFT GREATER SAPH AK COMPRESS: NORMAL
BH CV LOWER VASCULAR LEFT GREATER SAPH BK COMPRESS: NORMAL
BH CV LOWER VASCULAR LEFT LESSER SAPH COMPRESS: NORMAL
BH CV LOWER VASCULAR LEFT MID FEMORAL AUGMENT: NORMAL
BH CV LOWER VASCULAR LEFT MID FEMORAL COMPETENT: NORMAL
BH CV LOWER VASCULAR LEFT MID FEMORAL COMPRESS: NORMAL
BH CV LOWER VASCULAR LEFT MID FEMORAL PHASIC: NORMAL
BH CV LOWER VASCULAR LEFT MID FEMORAL SPONT: NORMAL
BH CV LOWER VASCULAR LEFT PERONEAL COMPRESS: NORMAL
BH CV LOWER VASCULAR LEFT POPLITEAL AUGMENT: NORMAL
BH CV LOWER VASCULAR LEFT POPLITEAL COMPETENT: NORMAL
BH CV LOWER VASCULAR LEFT POPLITEAL COMPRESS: NORMAL
BH CV LOWER VASCULAR LEFT POPLITEAL PHASIC: NORMAL
BH CV LOWER VASCULAR LEFT POPLITEAL SPONT: NORMAL
BH CV LOWER VASCULAR LEFT POPLITEAL THROMBUS: NORMAL
BH CV LOWER VASCULAR LEFT POSTERIOR TIBIAL COMPRESS: NORMAL
BH CV LOWER VASCULAR LEFT PROFUNDA FEMORAL COMPRESS: NORMAL
BH CV LOWER VASCULAR LEFT PROXIMAL FEMORAL COMPRESS: NORMAL
BH CV LOWER VASCULAR LEFT SAPHENOFEMORAL JUNCTION COMPRESS: NORMAL
BH CV LOWER VASCULAR RIGHT COMMON FEMORAL AUGMENT: NORMAL
BH CV LOWER VASCULAR RIGHT COMMON FEMORAL COMPETENT: NORMAL
BH CV LOWER VASCULAR RIGHT COMMON FEMORAL COMPRESS: NORMAL
BH CV LOWER VASCULAR RIGHT COMMON FEMORAL PHASIC: NORMAL
BH CV LOWER VASCULAR RIGHT COMMON FEMORAL SPONT: NORMAL
BH CV LOWER VASCULAR RIGHT DISTAL FEMORAL COMPRESS: NORMAL
BH CV LOWER VASCULAR RIGHT GASTRONEMIUS COMPRESS: NORMAL
BH CV LOWER VASCULAR RIGHT GREATER SAPH AK COMPRESS: NORMAL
BH CV LOWER VASCULAR RIGHT GREATER SAPH BK COMPRESS: NORMAL
BH CV LOWER VASCULAR RIGHT LESSER SAPH COMPRESS: NORMAL
BH CV LOWER VASCULAR RIGHT LESSER SAPH THROMBUS: NORMAL
BH CV LOWER VASCULAR RIGHT MID FEMORAL AUGMENT: NORMAL
BH CV LOWER VASCULAR RIGHT MID FEMORAL COMPETENT: NORMAL
BH CV LOWER VASCULAR RIGHT MID FEMORAL COMPRESS: NORMAL
BH CV LOWER VASCULAR RIGHT MID FEMORAL PHASIC: NORMAL
BH CV LOWER VASCULAR RIGHT MID FEMORAL SPONT: NORMAL
BH CV LOWER VASCULAR RIGHT PERONEAL COMPRESS: NORMAL
BH CV LOWER VASCULAR RIGHT POPLITEAL AUGMENT: NORMAL
BH CV LOWER VASCULAR RIGHT POPLITEAL COMPETENT: NORMAL
BH CV LOWER VASCULAR RIGHT POPLITEAL COMPRESS: NORMAL
BH CV LOWER VASCULAR RIGHT POPLITEAL PHASIC: NORMAL
BH CV LOWER VASCULAR RIGHT POPLITEAL SPONT: NORMAL
BH CV LOWER VASCULAR RIGHT POSTERIOR TIBIAL COMPRESS: NORMAL
BH CV LOWER VASCULAR RIGHT PROFUNDA FEMORAL COMPRESS: NORMAL
BH CV LOWER VASCULAR RIGHT PROXIMAL FEMORAL COMPRESS: NORMAL
BH CV LOWER VASCULAR RIGHT SAPHENOFEMORAL JUNCTION COMPRESS: NORMAL
BH CV VAS PRELIMINARY FINDINGS SCRIPTING: 1
BILIRUB SERPL-MCNC: 0.4 MG/DL (ref 0–1.2)
BUN SERPL-MCNC: 16 MG/DL (ref 6–20)
BUN/CREAT SERPL: 14.7 (ref 7–25)
CALCIUM SPEC-SCNC: 9.3 MG/DL (ref 8.6–10.5)
CHLORIDE SERPL-SCNC: 102 MMOL/L (ref 98–107)
CO2 SERPL-SCNC: 28.2 MMOL/L (ref 22–29)
CREAT SERPL-MCNC: 1.09 MG/DL (ref 0.57–1)
DEPRECATED RDW RBC AUTO: 41.8 FL (ref 37–54)
EGFRCR SERPLBLD CKD-EPI 2021: 58.6 ML/MIN/1.73
EOSINOPHIL # BLD AUTO: 0.11 10*3/MM3 (ref 0–0.4)
EOSINOPHIL NFR BLD AUTO: 1.5 % (ref 0.3–6.2)
ERYTHROCYTE [DISTWIDTH] IN BLOOD BY AUTOMATED COUNT: 13.4 % (ref 12.3–15.4)
FERRITIN SERPL-MCNC: 387 NG/ML (ref 13–150)
GLOBULIN UR ELPH-MCNC: 3.2 GM/DL
GLUCOSE SERPL-MCNC: 100 MG/DL (ref 65–99)
HCT VFR BLD AUTO: 41.4 % (ref 34–46.6)
HGB BLD-MCNC: 13.7 G/DL (ref 12–15.9)
IMM GRANULOCYTES # BLD AUTO: 0.01 10*3/MM3 (ref 0–0.05)
IMM GRANULOCYTES NFR BLD AUTO: 0.1 % (ref 0–0.5)
IRON 24H UR-MRATE: 88 MCG/DL (ref 37–145)
IRON SATN MFR SERPL: 24 % (ref 20–50)
LYMPHOCYTES # BLD AUTO: 2.44 10*3/MM3 (ref 0.7–3.1)
LYMPHOCYTES NFR BLD AUTO: 33.7 % (ref 19.6–45.3)
MCH RBC QN AUTO: 27.9 PG (ref 26.6–33)
MCHC RBC AUTO-ENTMCNC: 33.1 G/DL (ref 31.5–35.7)
MCV RBC AUTO: 84.3 FL (ref 79–97)
MONOCYTES # BLD AUTO: 0.64 10*3/MM3 (ref 0.1–0.9)
MONOCYTES NFR BLD AUTO: 8.8 % (ref 5–12)
NEUTROPHILS NFR BLD AUTO: 4 10*3/MM3 (ref 1.7–7)
NEUTROPHILS NFR BLD AUTO: 55.2 % (ref 42.7–76)
NRBC BLD AUTO-RTO: 0 /100 WBC (ref 0–0.2)
PLATELET # BLD AUTO: 256 10*3/MM3 (ref 140–450)
PMV BLD AUTO: 10.4 FL (ref 6–12)
POTASSIUM SERPL-SCNC: 3.9 MMOL/L (ref 3.5–5.2)
PROT SERPL-MCNC: 7.3 G/DL (ref 6–8.5)
RBC # BLD AUTO: 4.91 10*6/MM3 (ref 3.77–5.28)
SODIUM SERPL-SCNC: 141 MMOL/L (ref 136–145)
TIBC SERPL-MCNC: 362 MCG/DL (ref 298–536)
TRANSFERRIN SERPL-MCNC: 243 MG/DL (ref 200–360)
WBC NRBC COR # BLD AUTO: 7.25 10*3/MM3 (ref 3.4–10.8)

## 2025-05-05 PROCEDURE — 36415 COLL VENOUS BLD VENIPUNCTURE: CPT

## 2025-05-05 PROCEDURE — 82728 ASSAY OF FERRITIN: CPT

## 2025-05-05 PROCEDURE — 99214 OFFICE O/P EST MOD 30 MIN: CPT | Performed by: NURSE PRACTITIONER

## 2025-05-05 PROCEDURE — 85025 COMPLETE CBC W/AUTO DIFF WBC: CPT

## 2025-05-05 PROCEDURE — 80053 COMPREHEN METABOLIC PANEL: CPT

## 2025-05-05 PROCEDURE — 93970 EXTREMITY STUDY: CPT

## 2025-05-05 PROCEDURE — 84466 ASSAY OF TRANSFERRIN: CPT

## 2025-05-05 PROCEDURE — 93970 EXTREMITY STUDY: CPT | Performed by: SURGERY

## 2025-05-05 PROCEDURE — 83540 ASSAY OF IRON: CPT

## 2025-05-05 NOTE — PROGRESS NOTES
Subjective     CHIEF COMPLAINT:      Chief Complaint   Patient presents with    Follow-up     HISTORY OF PRESENT ILLNESS:     Kylie Bowie is a 60 y.o. female patient who returns today for follow up on her factor V Leiden mutation and thrombosis.  She is on Xarelto 20 mg daily.  She does report today she occasionally misses doses and is not always consistent with her dosing.  She does continue to have bilateral lower extremity swelling which waxes and wanes with her left having intermittent calf pain.  She has no chest pain or change in her shortness of breath.  She does have asthma therefore has baseline shortness of breath is not having any pleuritic pain.  She has no signs or symptoms of bleeding.    ROS:  Pertinent ROS is in the HPI.     Past medical, surgical, social and family history were reviewed.     MEDICATIONS:    Current Outpatient Medications:     albuterol (PROVENTIL HFA;VENTOLIN HFA) 108 (90 BASE) MCG/ACT inhaler, Inhale 2 puffs Every 4 (Four) Hours As Needed for Wheezing., Disp: , Rfl:     atorvastatin (LIPITOR) 20 MG tablet, Take 1 tablet by mouth Daily., Disp: , Rfl:     chlorthalidone (HYGROTON) 25 MG tablet, Take 1 tablet by mouth Daily., Disp: , Rfl:     Durolane 60 MG/3ML prefilled syringe injection, , Disp: , Rfl:     fluticasone (FLONASE) 50 MCG/ACT nasal spray, 2 sprays into the nostril(s) as directed by provider Daily. Administer 2 sprays in each nostril for each dose., Disp: 3 bottle, Rfl: 1    HYDROcodone-acetaminophen (NORCO) 5-325 MG per tablet, Take 1 tablet by mouth 3 (Three) Times a Day. OA, Disp: , Rfl:     lisinopril (PRINIVIL,ZESTRIL) 20 MG tablet, Take 1 tablet by mouth Daily., Disp: , Rfl:     meclizine (ANTIVERT) 25 MG tablet, Take 1 tablet by mouth 3 (Three) Times a Day As Needed for Dizziness., Disp: 60 tablet, Rfl: 1    montelukast (SINGULAIR) 10 MG tablet, Take 1 tablet by mouth Every Night., Disp: 90 tablet, Rfl: 1    omeprazole (priLOSEC) 40 MG capsule, TAKE 1 CAPSULE  "BY MOUTH DAILY, Disp: 30 capsule, Rfl: 11    rivaroxaban (Xarelto) 20 MG tablet, Take 1 tablet by mouth Daily., Disp: 90 tablet, Rfl: 3    Scopolamine 1 MG/3DAYS patch, Place 1 patch on the skin as directed by provider Every 72 (Seventy-Two) Hours., Disp: 4 patch, Rfl: 0    gabapentin (NEURONTIN) 300 MG capsule, Take 1 capsule by mouth At Night As Needed. (Patient not taking: Reported on 10/4/2024), Disp: , Rfl:     Objective     VITAL SIGNS:     Vitals:    05/05/25 1313   BP: 108/73   Pulse: 81   Resp: 16   Temp: 98.7 °F (37.1 °C)   TempSrc: Oral   SpO2: 98%   Weight: 110 kg (242 lb)   Height: 170.2 cm (67.01\")   PainSc: 0-No pain       Body mass index is 37.89 kg/m².     Wt Readings from Last 5 Encounters:   05/05/25 110 kg (242 lb)   10/04/24 106 kg (234 lb 1.6 oz)   03/15/24 106 kg (234 lb 4.8 oz)   03/15/24 106 kg (234 lb 4.8 oz)   08/17/23 106 kg (232 lb 9.6 oz)     PHYSICAL EXAMINATION:   GENERAL: The patient appears in good general condition, not in acute distress.   SKIN: No ecchymosis.  EYES: No jaundice. No Pallor.  CHEST: Normal respiratory effort.  Lungs clear bilaterally.  No added sounds.  CVS: Normal S1-S2.  No murmurs.  ABDOMEN: Nondistended.  EXTREMITIES: Bilateral lower extremity edema with left greater than right.  Patient reporting left calf tenderness    DIAGNOSTIC DATA:     Results from last 7 days   Lab Units 05/05/25  1307   WBC 10*3/mm3 7.25   NEUTROS ABS 10*3/mm3 4.00   HEMOGLOBIN g/dL 13.7   HEMATOCRIT % 41.4   PLATELETS 10*3/mm3 256     Results from last 7 days   Lab Units 05/05/25  1307   SODIUM mmol/L 141   POTASSIUM mmol/L 3.9   CHLORIDE mmol/L 102   CO2 mmol/L 28.2   BUN mg/dL 16   CREATININE mg/dL 1.09*   CALCIUM mg/dL 9.3   ALBUMIN g/dL 4.1   BILIRUBIN mg/dL 0.4   ALK PHOS U/L 98   ALT (SGPT) U/L 17   AST (SGOT) U/L 19   GLUCOSE mg/dL 100*           Lab 05/05/25  1307   IRON 88   IRON SATURATION (TSAT) 24   TIBC 362   TRANSFERRIN 243   FERRITIN 387.00*        Assessment & Plan  "   *Factor V Leiden heterozygous mutation.  She has a history of left lower extremity DVT that developed after a myomectomy, D&C in 1999.   She had a foot surgery and was placed on Xarelto 10 mg daily after the surgery.   She was diagnosed with acute PE and left lower extremity DVT on 8/19/2020.  There was no right heart strain.  Acute DVT was identified in the left femoral, popliteal, posterior tibial, peroneal and gastrocnemius/soleal veins.  She was hospitalized at [facility] on 8/19/2020 and anticoagulated with Lovenox.    VQ scan on 9/9/2020 showed no perfusion defects.    Venous Doppler on 9/9/2020 showed the thrombosis to have become chronic.  Based on recurrent thrombosis and the unprovoked acute DVT and PE in August 2020, lifelong anticoagulation was recommended.  CT angiogram on 2/15/2021 revealed resolution of the pulmonary embolism.  Venous doppler on 2/15/2021 revealed a chronic thrombus in the distal femoral and popliteal veins.  Venous Doppler on 8/20/2021 revealed chronic thrombus in the distal femoral, popliteal and gastrocnemius veins.  Bridging with Lovenox 30 mg every 12 hours immediately after surgery was recommended if she requires future orthopedic surgeries.  Due to her increased risk for postoperative thrombosis, her Orthopedic surgeon did not recommend surgery.  Venous Doppler on 1/16/2023 revealed chronic DVT in the distal femoral and popliteal veins.   10/4/2024 continuing on anticoagulation with Xarelto 20 mg daily with excellent tolerance, no signs or symptoms of bleeding, no signs or symptoms of thrombosis  5/5/2025 patient reports bilateral lower extremity swelling which is intermittent.  She also reports occasional missed doses of her Xarelto.  We will proceed with bilateral lower extremity Doppler ultrasound to evaluate the state of her thrombosis.    *Anemia secondary to iron deficiency in a patient with Beta thalassemia trait.   She was placed on ferrous sulfate 325 mg a day  on 4/25/2019.  She took the ferrous sulfate for 6 months.    Hemoglobin was normal at 13.2 on 7/30/2020.    Hemoglobin dropped to 11.7 on 9/22/2020.  Patient was started on ferrous sulfate 325 mg daily on 9/22/2020.  She did not respond to tolerate the oral iron which was eventually discontinued.  Patient was given IV Venofer x2 doses on 2/26/2021 and 3/12/2021.  Hemoglobin was 13.3 on 12/13/2021 with a transferrin saturation of 25% and ferritin of 567.  Hemoglobin was 12.6 on 6/6/2022.  Iron stores were adequate with ferritin of 286 and transferrin saturation 19%.  1/24/2023: Hemoglobin 14.1.  Iron stores adequate.  8/17/2023: Hemoglobin decreased to 12.7.  Iron stores remain adequate with ferritin at 461 and transferrin saturation at 23%.  3/15/2024: Hemoglobin 13.6.    Iron stores adequate with ferritin at 477 and transferrin saturation at 23%.  10/4/2024 she is without iron deficiency with hemoglobin normal at 13.8, ferritin 389, iron saturation 30%  5/20/2025 hemoglobin normal at 13.7 without iron deficiency.  Ferritin 387, iron saturation 24%    *Elevated serum creatinine.   Creatinine was 1.18 on 81/18/2022.  Patient increased her fluid intake.   Creatinine improved to 1.10 on 1/24/2023.  8/17/2023: Creatinine 1.13-attributed to the diuretic.    3/15/2024: Creatinine improved to 1.03.  5/5/2025 creatinine at baseline at 1.09    *Motion sickness.  She is going on a cruise soon.  Dr. Giang recommended scopolamine patch to be changed every 72 hours.     *History of vaginal bleeding secondary to uterine fibroids.    Patient reported to have enlargement of the uterus to a 20 week gestation size.    She was referred to gynecology oncology and surgery was recommended to be done ASAP.    They recommended having an IVC filter placed in preparation for the surgery.  Due to the timeframe from the diagnosis of a PE and since the patient was symptomatic in September 2020, the recommendation was to do radiosurgery ~6  months from the time of diagnosis of the PE (February 2021).  Patient decided in February 2021 that she did not want to have the surgery.  Patient reported in June 2022 that she was no longer having vaginal bleeding.  Patient states that the bleeding has not recurred.  She is likely in menopause.  I recommended continued follow-up with gynecology to reevaluate the fibroids-expected to decrease in size after menopause.    PLAN:    Continue Xarelto 20 mg daily. Patient encouraged to remain compliant.   STAT bilateral lower extremity doppler ultrasound.  We reviewed signs and symptoms of bleeding and signs and symptoms of thrombosis for which to monitor for  6-month follow-up with Dr. Giang with CBC, ferritin, iron profile and CMP    Candice Patel, OLEKSANDR  05/05/25

## 2025-06-30 RX ORDER — SCOPOLAMINE 1 MG/3D
1 PATCH, EXTENDED RELEASE TRANSDERMAL
Qty: 4 PATCH | Refills: 0 | OUTPATIENT
Start: 2025-06-30

## 2025-06-30 RX ORDER — MECLIZINE HYDROCHLORIDE 25 MG/1
25 TABLET ORAL 3 TIMES DAILY PRN
Qty: 60 TABLET | Refills: 1 | OUTPATIENT
Start: 2025-06-30

## 2025-06-30 NOTE — TELEPHONE ENCOUNTER
Per last office note this was being prescribed because patient was going on a cruise, if she needs it for another cruise she will need to call the office.

## 2025-07-02 RX ORDER — SCOPOLAMINE 1 MG/3D
1 PATCH, EXTENDED RELEASE TRANSDERMAL
Qty: 4 PATCH | Refills: 0 | Status: SHIPPED | OUTPATIENT
Start: 2025-07-02

## 2025-07-02 RX ORDER — MECLIZINE HYDROCHLORIDE 25 MG/1
25 TABLET ORAL 3 TIMES DAILY PRN
Qty: 60 TABLET | Refills: 0 | Status: SHIPPED | OUTPATIENT
Start: 2025-07-02

## 2025-08-06 ENCOUNTER — TELEPHONE (OUTPATIENT)
Dept: GASTROENTEROLOGY | Facility: CLINIC | Age: 60
End: 2025-08-06
Payer: COMMERCIAL

## (undated) DEVICE — TUBING, SUCTION, 1/4" X 10', STRAIGHT: Brand: MEDLINE

## (undated) DEVICE — SNAR POLYP CAPTIVATOR RND STFF 2.4 240CM 10MM 1P/U

## (undated) DEVICE — CANN O2 ETCO2 FITS ALL CONN CO2 SMPL A/ 7IN DISP LF

## (undated) DEVICE — BITEBLOCK OMNI BLOC

## (undated) DEVICE — ADAPT CLN BIOGUARD AIR/H2O DISP

## (undated) DEVICE — Device: Brand: DEFENDO AIR/WATER/SUCTION AND BIOPSY VALVE

## (undated) DEVICE — FRCP BX RADJAW4 NDL 2.8 240CM LG OG BX40

## (undated) DEVICE — THE SINGLE USE ETRAP – POLYP TRAP IS USED FOR SUCTION RETRIEVAL OF ENDOSCOPICALLY REMOVED POLYPS.: Brand: ETRAP

## (undated) DEVICE — CANN NASL CO2 TRULINK W/O2 A/

## (undated) DEVICE — PATIENT RETURN ELECTRODE, SINGLE-USE, CONTACT QUALITY MONITORING, ADULT, WITH 9FT CORD, FOR PATIENTS WEIGING OVER 33LBS. (15KG): Brand: MEGADYNE

## (undated) DEVICE — TBG 02 CRUSH RESIST LF CLR 7FT

## (undated) DEVICE — SENSR O2 OXIMAX FNGR A/ 18IN NONSTR

## (undated) DEVICE — SINGLE-USE BIOPSY FORCEPS: Brand: RADIAL JAW 4

## (undated) DEVICE — LN SMPL CO2 SHTRM SD STREAM W/M LUER

## (undated) DEVICE — SNAR POLYP SENSATION STDOVL 27 240 BX40

## (undated) DEVICE — KT ORCA ORCAPOD DISP STRL

## (undated) DEVICE — THE TORRENT IRRIGATION SCOPE CONNECTOR IS USED WITH THE TORRENT IRRIGATION TUBING TO PROVIDE IRRIGATION FLUIDS SUCH AS STERILE WATER DURING GASTROINTESTINAL ENDOSCOPIC PROCEDURES WHEN USED IN CONJUNCTION WITH AN IRRIGATION PUMP (OR ELECTROSURGICAL UNIT).: Brand: TORRENT